# Patient Record
Sex: MALE | Race: BLACK OR AFRICAN AMERICAN | NOT HISPANIC OR LATINO | URBAN - METROPOLITAN AREA
[De-identification: names, ages, dates, MRNs, and addresses within clinical notes are randomized per-mention and may not be internally consistent; named-entity substitution may affect disease eponyms.]

---

## 2017-10-18 ENCOUNTER — OFFICE VISIT (OUTPATIENT)
Dept: FAMILY MEDICINE | Facility: CLINIC | Age: 47
End: 2017-10-18

## 2017-10-18 VITALS
BODY MASS INDEX: 26.29 KG/M2 | TEMPERATURE: 98.4 F | DIASTOLIC BLOOD PRESSURE: 84 MMHG | OXYGEN SATURATION: 100 % | HEIGHT: 66 IN | WEIGHT: 163.6 LBS | SYSTOLIC BLOOD PRESSURE: 137 MMHG | HEART RATE: 65 BPM

## 2017-10-18 DIAGNOSIS — M54.2 NECK PAIN: Primary | ICD-10-CM

## 2017-10-18 RX ORDER — CYCLOBENZAPRINE HCL 10 MG
10 TABLET ORAL 3 TIMES DAILY PRN
Qty: 60 TABLET | Refills: 0 | Status: SHIPPED | OUTPATIENT
Start: 2017-10-18 | End: 2017-12-01

## 2017-10-18 RX ORDER — NAPROXEN 500 MG/1
500 TABLET ORAL
Qty: 60 TABLET | Refills: 1 | Status: SHIPPED | OUTPATIENT
Start: 2017-10-18 | End: 2017-12-01

## 2017-10-18 NOTE — PATIENT INSTRUCTIONS
Thank you for choosing [facility].   I recommend Physical Therapy for your neck and back.  For now I would like you to use Naproxen 500mg and a Muscle Relaxer   Also, please stop at the  for your referral and physical appointment.  Also, please return if your neck or back worsens.     Thank you for coming to [facility].  **If you had lab testing today and your results are reassuring or normal they will be be mailed to you within 7 days.   **If the lab tests need quick action we will call you with the results.  The phone number we will call with results is # [phone] (home) . If this is not the best number please call our clinic and change the number.  If you need any refills please call your pharmacy and they will contact us.  If you have any concerns about today's visit or wish to schedule another appointment please call our office during normal business hours [phone] (8-5:00 M-F)  If you have urgent medical concerns please call [phone] at any time of the day.  If you a medical emergency please call [phone]  Again thank you for choosing [facility] and please let us know how we can best partner with you to improve you and your family's health.    PHYSICAL THERAPY REFERRAL:  Orders and demographics faxed to [facility] and they will call patient to schedule.  PH:  [phone]   FAX:  [phone]  Akua Lima  10/19/17

## 2017-10-18 NOTE — PROGRESS NOTES
"There are no exam notes on file for this visit.  Chief Complaint   Patient presents with     Establish Care     Pain     neck, shoulder and upper mid-back, began a gym membership 1 month ago and has been having pain for 3 weeks, has tried ice and heat      Blood pressure 137/84, pulse 65, temperature 98.4  F (36.9  C), height 5' 6.34\" (168.5 cm), weight 163 lb 9.6 oz (74.2 kg), SpO2 100 %.    The patient comes in today to establish care, and for evaluation of neck pain.    The patient is a 47-year-old male who has completed 12 grades of formal education. 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 He is currently . He has not been hospitalized overnight and has not had any surgeries in the last 5 years. He has no allergy to penicillin. He is unclear what that allergy entails but was told by his mother that he should not take penicillin again. The patient does smoke about a half a pack of cigarettes a day and he does not drink alcohol.  He has never had an ulcer, GI problems, or renal problems.    Patient's acute issue is that he joined a gym about 1 month ago. For the last 3 weeks, the patient has had left sided shoulder pain. The patient describes this pain is located in the left posterior neck radiating down to his left shoulder. He also notes some pain that radiates down the middle of his back. He brings with him a picture from the Internet on his cell phone of distribution of a herniated neck disc and says that his pain is in this distribution.    The patient awoke one morning 3 weeks ago with a stiff neck. By the following day he was \"paralyzed \". She has been using ice and heat and this is not been much help. He has been using ibuprofen which does not completely alleviate the pain but helps him \"get by\".     The patient notes the pain as best as he is standing and is worse as he sits down. He also does describes a stiffness in his neck.    Objective: This is a well-nourished, " well-developed male, who is in no acute distress. His vital signs are as noted above, and are within normal limits. His neck has a normal range of motion, although he has some subjective pain on extreme rotation to the right and right lateral bending. He does not have any midline cervical thoracic or lumbar tenderness area he does have tenderness in the right lateral neck, although this does not extend down into his upper back. He shortly range of motion is within normal limits. His muscle strength is within normal limits in all major muscle groups in the upper extremities. His sensation is intact to light touch.    Assessment: Neck pain. I believe this is likelysecondary to starting working out at the gym, because they seem  related in the time course.    Plan: I discussed the patient I believe physical therapy is keyed to this. I believe they can help both with the acute pain, as well as provide him with instructions to prevent recurrence of this pain. In the meantime, we'll treat his symptoms with naproxen and cyclobenzaprine. Patient was cautioned regarding the GI side effects of NSAID use. He is to take the naproxen with meals. He is not to exceed the dose. We will see the patient back in 2 weeks if he is still having difficulty. Patient would like to schedule an appointment for a physical examination and this is encouraged.    Neck pain  -     PHYSICAL THERAPY REFERRAL; Future  -     naproxen (NAPROSYN) 500 MG tablet; Take 1 tablet (500 mg) by mouth 3 times daily (with meals)  -     cyclobenzaprine (FLEXERIL) 10 MG tablet; Take 1 tablet (10 mg) by mouth 3 times daily as needed for muscle spasms    The patient was actively involved in the decision making process, and all the questions were answered to their satisfaction prior to leaving.

## 2017-10-18 NOTE — MR AVS SNAPSHOT
After Visit Summary   10/18/2017    Jarvis Velásquez    MRN: [id number]           Patient Information     Date Of Birth          1970        Visit Information        Provider Department      10/18/2017 10:00 AM Dex Chen MD [facility]        Today's Diagnoses     Neck pain    -  1      Care Instructions    Thank you for choosing [facility].   I recommend Physical Therapy for your neck and back.  For now I would like you to use Naproxen 500mg and a Muscle Relaxer   Also, please stop at the  for your referral and physical appointment.  Also, please return if your neck or back worsens.     Thank you for coming to [facility].  **If you had lab testing today and your results are reassuring or normal they will be be mailed to you within 7 days.   **If the lab tests need quick action we will call you with the results.  The phone number we will call with results is # [phone] (home) . If this is not the best number please call our clinic and change the number.  If you need any refills please call your pharmacy and they will contact us.  If you have any concerns about today's visit or wish to schedule another appointment please call our office during normal business hours [phone] (8-5:00 M-F)  If you have urgent medical concerns please call [phone] at any time of the day.  If you a medical emergency please call [phone]  Again thank you for choosing [facility] and please let us know how we can best partner with you to improve you and your family's health.              Follow-ups after your visit        Additional Services     PHYSICAL THERAPY REFERRAL       PT/OT REFERRAL  Jarvis Velásquez  1970  Phone #: [phone] (home)    needed: No  Language: English    PT/OT  Facility:   [facility], P: [phone], F: [phone]    History: Neck and back pain    Precautions/Contraindications: None    Imaging Studies:  "None    Prognosis: excellent    Visits: Up to 12    Evaluate: Evaluate and treat                  Future tests that were ordered for you today     Open Future Orders        Priority Expected Expires Ordered    PHYSICAL THERAPY REFERRAL Routine  10/18/2018 10/18/2017            Who to contact     Please call your clinic at [phone] to:    Ask questions about your health    Make or cancel appointments    Discuss your medicines    Learn about your test results    Speak to your doctor   If you have compliments or concerns about an experience at your clinic, or if you wish to file a complaint, please contact [facility] Patient Relations at [phone] or email us at [email or web address]         Additional Information About Your Visit        CoinSeedhart Information     [employer] is an electronic gateway that provides easy, online access to your medical records. With Ahalogy, you can request a clinic appointment, read your test results, renew a prescription or communicate with your care team.     To sign up for Ahalogy visit the website at [email or web address]   You will be asked to enter the access code listed below, as well as some personal information. Please follow the directions to create your username and password.     Your access code is: [email or web address]  Expires: 2018 10:41 AM     Your access code will  in 90 days. If you need help or a new code, please contact your [facility] Clinic or call [phone] for assistance.        Care EveryWhere ID     This is your Care EveryWhere ID. This could be used by other organizations to access your [facility] medical records  [id number]        Your Vitals Were     Pulse Temperature Height Pulse Oximetry BMI (Body Mass Index)       65 98.4  F (36.9  C) 5' 6.34\" (168.5 cm) 100% 26.14 kg/m2        Blood Pressure from Last 3 Encounters:   10/18/17 137/84    Weight from Last 3 Encounters:   10/18/17 163 lb " 9.6 oz (74.2 kg)                 Today's Medication Changes          These changes are accurate as of: 10/18/17 12:03 PM.  If you have any questions, ask your nurse or doctor.               Start taking these medicines.        Dose/Directions    cyclobenzaprine 10 MG tablet   Commonly known as:  FLEXERIL   Used for:  Neck pain   Started by:  Dex Chen MD        Dose:  10 mg   Take 1 tablet (10 mg) by mouth 3 times daily as needed for muscle spasms   Quantity:  60 tablet   Refills:  0       naproxen 500 MG tablet   Commonly known as:  NAPROSYN   Used for:  Neck pain   Started by:  Dex Chen MD        Dose:  500 mg   Take 1 tablet (500 mg) by mouth 3 times daily (with meals)   Quantity:  60 tablet   Refills:  1            Where to get your medicines      These medications were sent to [facility] [address] [place] [address]) [place] [address]     Phone:  [phone]     cyclobenzaprine 10 MG tablet    naproxen 500 MG tablet                Primary Care Provider Office Phone # Fax #    Tee Ng [phone]       [address] [address]        Equal Access to Services     ARUNA PHAM : Hadii georgia sinha hadasho Soomaali, waaxda luqadaha, qaybta kaalmada adeegyada, jaden sanchez haykatie jalloh. So [facility] [phone].    ATENCIÓN: Si habla español, tiene a vega disposición servicios gratuitos de asistencia lingüística. Llame al [phone].    We comply with applicable federal civil rights laws and [place] laws. We do not discriminate on the basis of race, color, national origin, age, disability, sex, sexual orientation, or gender identity.            Thank you!     Thank you for choosing [facility]  for your care. Our goal is always to provide you with excellent care. Hearing back from our patients is one way we can continue to improve our services. Please  take a few minutes to complete the written survey that you may receive in the mail after your visit with us. Thank you!             Your Updated Medication List - Protect others around you: Learn how to safely use, store and throw away your medicines at www.disposemymeds.org.          This list is accurate as of: 10/18/17 12:03 PM.  Always use your most recent med list.                   Brand Name Dispense Instructions for use Diagnosis    cyclobenzaprine 10 MG tablet    FLEXERIL    60 tablet    Take 1 tablet (10 mg) by mouth 3 times daily as needed for muscle spasms    Neck pain       naproxen 500 MG tablet    NAPROSYN    60 tablet    Take 1 tablet (500 mg) by mouth 3 times daily (with meals)    Neck pain

## 2017-10-20 ENCOUNTER — AMBULATORY - HEALTHEAST (OUTPATIENT)
Dept: ADMINISTRATIVE | Facility: REHABILITATION | Age: 47
End: 2017-10-20

## 2017-10-20 DIAGNOSIS — M54.2 NECK PAIN: ICD-10-CM

## 2017-11-02 ENCOUNTER — TELEPHONE (OUTPATIENT)
Dept: FAMILY MEDICINE | Facility: CLINIC | Age: 47
End: 2017-11-02

## 2017-12-01 ENCOUNTER — OFFICE VISIT (OUTPATIENT)
Dept: FAMILY MEDICINE | Facility: CLINIC | Age: 47
End: 2017-12-01

## 2017-12-01 VITALS
DIASTOLIC BLOOD PRESSURE: 88 MMHG | WEIGHT: 168 LBS | HEART RATE: 64 BPM | OXYGEN SATURATION: 100 % | BODY MASS INDEX: 26.84 KG/M2 | TEMPERATURE: 98.4 F | SYSTOLIC BLOOD PRESSURE: 136 MMHG

## 2017-12-01 DIAGNOSIS — M25.512 ACUTE PAIN OF LEFT SHOULDER: ICD-10-CM

## 2017-12-01 DIAGNOSIS — Z00.00 ROUTINE GENERAL MEDICAL EXAMINATION AT A HEALTH CARE FACILITY: ICD-10-CM

## 2017-12-01 DIAGNOSIS — M54.2 NECK PAIN: Primary | ICD-10-CM

## 2017-12-01 LAB
CHOLEST SERPL-MCNC: 216.6 MG/DL (ref 0–200)
CHOLEST/HDLC SERPL: 3.1 {RATIO} (ref 0–5)
HBA1C MFR BLD: 5.5 % (ref 4.1–5.7)
HDLC SERPL-MCNC: 70.6 MG/DL
LDLC SERPL CALC-MCNC: 119 MG/DL (ref 0–129)
TRIGL SERPL-MCNC: 134.1 MG/DL (ref 0–150)
VLDL CHOLESTEROL: 26.8 MG/DL (ref 7–32)

## 2017-12-01 RX ORDER — NAPROXEN 500 MG/1
500 TABLET ORAL 2 TIMES DAILY WITH MEALS
Qty: 60 TABLET | Refills: 1 | Status: SHIPPED | OUTPATIENT
Start: 2017-12-01 | End: 2018-07-13

## 2017-12-01 RX ORDER — CYCLOBENZAPRINE HCL 10 MG
10 TABLET ORAL 3 TIMES DAILY PRN
Qty: 60 TABLET | Refills: 0 | Status: SHIPPED | OUTPATIENT
Start: 2017-12-01 | End: 2018-07-13

## 2017-12-01 RX ORDER — CYCLOBENZAPRINE HCL 10 MG
10 TABLET ORAL 3 TIMES DAILY PRN
Qty: 60 TABLET | Refills: 0 | Status: CANCELLED | OUTPATIENT
Start: 2017-12-01

## 2017-12-01 NOTE — PROGRESS NOTES
This is a 47-year-old male who follows up for a complaint of neck pain and left shoulder pain. He had been seen here about 6 weeks ago and had been referred to physical therapy however some miscommunication occurred and he ended up never hearing about the appointment. He remains interested in pursuing this.    I reviewed the chronology of his symptoms with him. He says that about 2-3 months ago he started working out at a gym having been relatively sedentary prior to that. He doesn't know if he injured himself but developed acute neck pain radiating down his left trapezius into his shoulder which is quite severe over the course of one to 2 weeks. Initially the was a burning quality to the pain and the was some numbness over the left trapezius area.    He reports that his symptoms have improved quite a bit over the last 6 weeks without any treatment. He has been taking the naproxen and Flexeril given him and thinks they do help. Currently he does not have any sensory symptoms. He is able to move his neck without discomfort. He continues to notice occasional shoulder pains when he moves a certain way and his difficulty reproducing those movements for me today. He notices some clunking and clicking in his shoulder that he believes was not present previously.    He is also interested in checking a lipid panel. He says he doesn't believe he's ever had one checked as an adult. He feels he is generally healthy. He's not stay in touch with the family and still doesn't have a full sense of family history. He's never had a physical as an adult.    He is currently working as a  having previously worked as a cook.    Objective:  /88  Pulse 64  Temp 98.4  F (36.9  C)  Wt 168 lb (76.2 kg)  SpO2 100%  BMI 26.84 kg/m2  His vitals are satisfactory.  His range of motion of the cervical spine appears within normal limits. He has no tenderness on palpation of the cervical spinous processes. He has inconsistent  findings on testing for impingement syndrome of the left shoulder with some sizing positive and others being negative. For example the pour can sign is mildly positive.  I offered him a cortisone shot and to the shoulder but he declined for now wishing to see if physical therapy alone will resolve the symptoms.    Jarvis was seen today for shoulder pain.    Diagnoses and all orders for this visit:    Neck pain  -     PHYSICAL THERAPY REFERRAL; Future  -     naproxen (NAPROSYN) 500 MG tablet; Take 1 tablet (500 mg) by mouth 2 times daily (with meals)  -     cyclobenzaprine (FLEXERIL) 10 MG tablet; Take 1 tablet (10 mg) by mouth 3 times daily as needed for muscle spasms    Acute pain of left shoulder  -     PHYSICAL THERAPY REFERRAL; Future    Routine general medical examination at a health care facility  -     Hemoglobin A1c ([facility])  -     Lipid Panel ([facility])     I placed a referral to physical therapy expressly asking for some cervical traction. I believe he is a mixture of both neck and shoulder etiologies for his symptoms. We've agreed that he will return in 4 weeks' time if his symptoms are not fully better and I would either consider MRI imaging at that time versus a cortisone shot into the left shoulder.    I've ordered a lipid panel and a screen for diabetes. He could consider having a healthcare maintenance exam in the future.

## 2017-12-01 NOTE — LETTER
December 4, 2017      Jarvis Velásqeuz  [address] [place] [address]        Dear Jarvis,    Please see below for your test results.    Resulted Orders   Hemoglobin A1c ([facility])   Result Value Ref Range    Hemoglobin A1C 5.5 4.1 - 5.7 %   Lipid Panel ([facility])   Result Value Ref Range    Cholesterol 216.6 (H) 0.0 - 200.0 mg/dL    Cholesterol/HDL Ratio 3.1 0.0 - 5.0    HDL Cholesterol 70.6 >40.0 mg/dL    LDL Cholesterol Calculated 119 0 - 129 mg/dL    Triglycerides 134.1 0.0 - 150.0 mg/dL    VLDL Cholesterol 26.8 7.0 - 32.0 mg/dL     Carlos A Conley -     You are not diabetic and your cholesterol panel is good because you have a high level of the protective HDL cholesterol.        Take care, Dr Ng

## 2017-12-01 NOTE — PATIENT INSTRUCTIONS
If you are not 100% better in 4 weeks, please come back to see me.      Referral has been sent to [facility] per request.   Phone:[phone]  Fax:[phone]  They will contact patient to schedule. Number has been verify ed with patient.   Kathi  12/01/17

## 2018-01-16 ENCOUNTER — OFFICE VISIT (OUTPATIENT)
Dept: FAMILY MEDICINE | Facility: CLINIC | Age: 48
End: 2018-01-16
Payer: COMMERCIAL

## 2018-01-16 VITALS
TEMPERATURE: 98.1 F | WEIGHT: 171.6 LBS | BODY MASS INDEX: 27.41 KG/M2 | HEART RATE: 92 BPM | SYSTOLIC BLOOD PRESSURE: 121 MMHG | DIASTOLIC BLOOD PRESSURE: 76 MMHG

## 2018-01-16 DIAGNOSIS — H61.23 BILATERAL IMPACTED CERUMEN: Primary | ICD-10-CM

## 2018-01-16 NOTE — PROGRESS NOTES
There are no exam notes on file for this visit.  Chief Complaint   Patient presents with     Otalgia     his right ear has been plugged up for 1 week and no drainage at all      Blood pressure 121/76, pulse 92, temperature 98.1  F (36.7  C), temperature source Oral, weight 171 lb 9.6 oz (77.8 kg).    Assessment and Plan   (H61.23) Bilateral impacted cerumen  (primary encounter diagnosis)  Comment: s/p irrigation and removal of ear wax      Options for treatment and follow-up care were reviewed with the patient and/or guardian. Jarvis LEDEZMA Didier and/or guardian engaged in the decision making process and verbalized understanding of the options discussed and agreed with the final plan.  Patient discussed with Dr. Constantino ears: Significant amount of.   Turner Armijo, DO LYMAN       Jarvis Velásquez is a 47 year old  Male of right-sided ear fullness and congestion.  Patient does use Q-tips to clean his ears but has noted over the last several days that there is congestion and the feeling of debris in the right ear canal.  He has applied eardrops without relief.  He has some decreased hearing because of this.  No trauma or drainage noted.  No fevers chills or recent upper respiratory infections. Never had this before.    There is no problem list on file for this patient.    Current Outpatient Prescriptions   Medication Sig Dispense Refill     naproxen (NAPROSYN) 500 MG tablet Take 1 tablet (500 mg) by mouth 2 times daily (with meals) (Patient not taking: Reported on 1/16/2018) 60 tablet 1     cyclobenzaprine (FLEXERIL) 10 MG tablet Take 1 tablet (10 mg) by mouth 3 times daily as needed for muscle spasms (Patient not taking: Reported on 1/16/2018) 60 tablet 0     Allergies   Allergen Reactions     Penicillins Unknown     History reviewed. No pertinent family history.  No results found for this or any previous visit (from the past 24 hour(s)).         Review of Systems:   As Above             Physical Exam:      Vitals:    01/16/18 1633   BP: 121/76   Pulse: 92   Temp: 98.1  F (36.7  C)   TempSrc: Oral   Weight: 171 lb 9.6 oz (77.8 kg)     Body mass index is 27.41 kg/(m^2).    Earwax and other hyperpigmented debris noted in the right and to a lesser extent left ear canal.  Utilized a curette and removed a significant amount of debris in the right ear canal and then both ear canals were irrigated.  After irrigation TMs were clearly visualized and did not show any evidence of erythema bulging or infection.    Office Visit on 12/01/2017   Component Date Value Ref Range Status     Hemoglobin A1C 12/01/2017 5.5  4.1 - 5.7 % Final     Cholesterol 12/01/2017 216.6* 0.0 - 200.0 mg/dL Final     Cholesterol/HDL Ratio 12/01/2017 3.1  0.0 - 5.0 Final     HDL Cholesterol 12/01/2017 70.6  >40.0 mg/dL Final     LDL Cholesterol Calculated 12/01/2017 119  0 - 129 mg/dL Final     Triglycerides 12/01/2017 134.1  0.0 - 150.0 mg/dL Final     VLDL Cholesterol 12/01/2017 26.8  7.0 - 32.0 mg/dL Final

## 2018-01-16 NOTE — MR AVS SNAPSHOT
After Visit Summary   2018    Jarvis Velásquez    MRN: [id number]           Patient Information     Date Of Birth          1970        Visit Information        Provider Department      2018 4:30 PM Turner Armijo, [facility]        Today's Diagnoses     Bilateral impacted cerumen    -  1       Follow-ups after your visit        Who to contact     Please call your clinic at [phone] to:    Ask questions about your health    Make or cancel appointments    Discuss your medicines    Learn about your test results    Speak to your doctor   If you have compliments or concerns about an experience at your clinic, or if you wish to file a complaint, please contact [facility] Patient Relations at [phone] or email us at [email or web address]         Additional Information About Your Visit        MyChart Information     The Moment is an electronic gateway that provides easy, online access to your medical records. With The Moment, you can request a clinic appointment, read your test results, renew a prescription or communicate with your care team.     To sign up for Zipline Gamest visit the website at [email or web address]   You will be asked to enter the access code listed below, as well as some personal information. Please follow the directions to create your username and password.     Your access code is: [email or web address]  Expires: 2018 12:48 PM     Your access code will  in 90 days. If you need help or a new code, please contact your [facility] or call [phone] for assistance.        Care EveryWhere ID     This is your Care EveryWhere ID. This could be used by other organizations to access your [facility] medical records  [id number]        Your Vitals Were     Pulse Temperature BMI (Body Mass Index)             92 98.1  F (36.7  C) (Oral) 27.41 kg/m2          Blood Pressure from Last 3 Encounters:    01/16/18 121/76   12/01/17 136/88   10/18/17 137/84    Weight from Last 3 Encounters:   01/16/18 171 lb 9.6 oz (77.8 kg)   12/01/17 168 lb (76.2 kg)   10/18/17 163 lb 9.6 oz (74.2 kg)              Today, you had the following     No orders found for display       Primary Care Provider Office Phone # Fax #    Tee Ng [phone]       [address] [address]        Equal Access to Services     [facility] : Hadii georgia ku hadasho Soomaali, waaxda luqadaha, qaybta kaalmada adefionayarossy, jaden perera . So [facility] [phone].    ATENCIÓN: Si habla español, tiene a vega disposición servicios gratuitos de asistencia lingüística. [facility] [phone].    We comply with applicable federal civil rights laws and [place] laws. We do not discriminate on the basis of race, color, national origin, age, disability, sex, sexual orientation, or gender identity.            Thank you!     Thank you for choosing [facility]  for your care. Our goal is always to provide you with excellent care. Hearing back from our patients is one way we can continue to improve our services. Please take a few minutes to complete the written survey that you may receive in the mail after your visit with us. Thank you!             Your Updated Medication List - Protect others around you: Learn how to safely use, store and throw away your medicines at www.disposemymeds.org.          This list is accurate as of 1/16/18 11:59 PM.  Always use your most recent med list.                   Brand Name Dispense Instructions for use Diagnosis    cyclobenzaprine 10 MG tablet    FLEXERIL    60 tablet    Take 1 tablet (10 mg) by mouth 3 times daily as needed for muscle spasms    Neck pain       naproxen 500 MG tablet    NAPROSYN    60 tablet    Take 1 tablet (500 mg) by mouth 2 times daily (with meals)    Neck pain

## 2018-01-16 NOTE — PROGRESS NOTES
Preceptor attestation:  Patient seen and discussed with the resident. Assessment and plan reviewed with resident and agreed upon.  Supervising physician: Frederick Constantino MD  [facility]

## 2018-07-05 ENCOUNTER — TELEPHONE (OUTPATIENT)
Dept: FAMILY MEDICINE | Facility: CLINIC | Age: 48
End: 2018-07-05

## 2018-07-05 NOTE — TELEPHONE ENCOUNTER
Patient states that he was calling to schedule an appt with Dr. Chen. Patient was transferred to appt. /LETICIA Ha

## 2018-07-05 NOTE — TELEPHONE ENCOUNTER
[facility] phone call message- general phone call:    Reason for call: He needs a call back from  he would like a call back re his last appointment (would not leave more detail).    Return call needed: Yes    OK to leave a message on voice mail? Yes    Primary language: English      needed? No    Call taken on July 5, 2018 at 10:37 AM by Selena Garrison

## 2018-07-13 ENCOUNTER — OFFICE VISIT (OUTPATIENT)
Dept: FAMILY MEDICINE | Facility: CLINIC | Age: 48
End: 2018-07-13
Payer: COMMERCIAL

## 2018-07-13 VITALS
SYSTOLIC BLOOD PRESSURE: 112 MMHG | BODY MASS INDEX: 26.14 KG/M2 | TEMPERATURE: 98.3 F | OXYGEN SATURATION: 100 % | RESPIRATION RATE: 20 BRPM | WEIGHT: 163.6 LBS | HEART RATE: 76 BPM | DIASTOLIC BLOOD PRESSURE: 79 MMHG

## 2018-07-13 DIAGNOSIS — Z23 NEED FOR VACCINATION: ICD-10-CM

## 2018-07-13 DIAGNOSIS — M54.2 NECK PAIN: ICD-10-CM

## 2018-07-13 DIAGNOSIS — N52.9 ERECTILE DYSFUNCTION, UNSPECIFIED ERECTILE DYSFUNCTION TYPE: ICD-10-CM

## 2018-07-13 DIAGNOSIS — Z00.00 PREVENTATIVE HEALTH CARE: Primary | ICD-10-CM

## 2018-07-13 DIAGNOSIS — Z00.00 ROUTINE GENERAL MEDICAL EXAMINATION AT A HEALTH CARE FACILITY: ICD-10-CM

## 2018-07-13 DIAGNOSIS — Z00.00 HEALTH CARE MAINTENANCE: ICD-10-CM

## 2018-07-13 LAB — HIV 1+2 AB+HIV1 P24 AG SERPL QL IA: NEGATIVE

## 2018-07-13 RX ORDER — SILDENAFIL 100 MG/1
50 TABLET, FILM COATED ORAL DAILY PRN
Qty: 6 TABLET | Refills: 1 | Status: SHIPPED | OUTPATIENT
Start: 2018-07-13 | End: 2018-07-20

## 2018-07-13 RX ORDER — NAPROXEN 500 MG/1
500 TABLET ORAL 2 TIMES DAILY WITH MEALS
Qty: 60 TABLET | Refills: 1 | Status: SHIPPED | OUTPATIENT
Start: 2018-07-13 | End: 2018-12-26

## 2018-07-13 NOTE — LETTER
July 16, 2018      Jarvis Velásquez  [address] [place] [address]        Dear Jarvis,    Please see below for your test results.    The HIV and syphilis tests are normal     Resulted Orders   HIV Ag/Ab Screen [place] ([facility])   Result Value Ref Range    HIV Antigen/Antibody Negative Negative    Narrative    Test performed by:  [facility] [address] [place] [address]  Method is Abbott HIV Ag/Ab for the detection of HIV p24 antigen, HIV-1   antibodies and HIV-2 antibodies.   Syphilis Screen [place] (RPR/VDRL) ([facility])   Result Value Ref Range    Treponema Antibody (Syphilis) Negative Negative    Narrative    Test performed by:  [facility] [address] [place] [address]       If you have any questions, please call the clinic to make an appointment.    Sincerely,    Dex Chen MD

## 2018-07-13 NOTE — PATIENT INSTRUCTIONS
"If the medicine is too expensive, use \"Good Rx\" to look for the best price around    Preventive Health Recommendations  Male Ages 40 to 49    Yearly exam:             See your health care provider every year in order to  o   Review health changes.   o   Discuss preventive care.    o   Review your medicines if your doctor has prescribed any.    You should be tested each year for STDs (sexually transmitted diseases) if you re at risk.     Have a cholesterol test every 5 years.     Have a colonoscopy (test for colon cancer) if someone in your family has had colon cancer or polyps before age 50.     After age 45, have a diabetes test (fasting glucose). If you are at risk for diabetes, you should have this test every 3 years.      Talk with your health care provider about whether or not a prostate cancer screening test (PSA) is right for you.    Shots: Get a flu shot each year. Get a tetanus shot every 10 years.     Nutrition:    Eat at least 5 servings of fruits and vegetables daily.     Eat whole-grain bread, whole-wheat pasta and brown rice instead of white grains and rice.     Get adequate Calcium and Vitamin D.     Lifestyle    Exercise for at least 150 minutes a week (30 minutes a day, 5 days a week). This will help you control your weight and prevent disease.     Limit alcohol to one drink per day.     No smoking.     Wear sunscreen to prevent skin cancer.     See your dentist every six months for an exam and cleaning.      "

## 2018-07-13 NOTE — PROGRESS NOTES
"    Male Physical Note      Concerns today: The patient presented today for a complete physical examination.    Is a 47-year-old male who has completed 12 years of education. He currently works as a cook at [employer].    The patient believes he is allergic to penicillin. He has no idea what the reaction was. He was told by his mother \"a long time ago\" that he was allergic to penicillin. He has since passed this on to his providers.    The patient's review of systems is negative for everything except sexual difficulties.    The patient states that over the past 2-3 months he has had difficulty maintaining an adequate erection. The patient tells me that his erection not last long enough for him to complete intercourse satisfactorily. He has tried over-the-counter medications from health stores, and found this not to be of any benefit.    The patient does not endorse nocturia. He does believe that he empties completely. He does not have any dysuria or hesitancy, but he does endorse urinary urgency. He believes this is because he drinks quite a bit of water while at work.    The patient smokes a half a pack per day of cigarettes. He is not particularly interested in stopping at this point, but believes that stopping would be a good thing to do at some point in the future.    He does not use street drugs or marijuana. He does feel safe at home. He's never been the victim of sexual, emotional, or physical abuse. He does not drink alcohol.      ROS:                      CONSTITUTIONAL: no fatigue, no unexpected change in weight  SKIN: no worrisome rashes, no worrisome moles, no worrisome lesions  EYES: no acute vision problems or changes  ENT: no ear problems, no mouth problems, no throat problems  RESP: no significant cough, no shortness of breath  CV: no chest pain, no palpitations, no new or worsening peripheral edema  GI: no nausea, no vomiting, no constipation, no diarrhea  : no frequency, no " dysuria, no hematuria  MS: no claudication, no myalgias, no joint aches  NEURO: no weakness, no dizziness, no syncope, no headaches    History reviewed. No pertinent past medical history.     History reviewed. No pertinent family history.  Reviewed no other significant FH           Family History and past Medical History reviewed and unchanged/updated.    Social History   Substance Use Topics     Smoking status: Current Every Day Smoker     Packs/day: 0.50     Types: Cigarettes     Smokeless tobacco: Never Used     Alcohol use No     Partnered    Has anyone hurt you physically, for example by pushing, hitting, slapping or kicking you or forcing you to have sex? Denies  Do you feel threatened or controlled by a partner, ex-partner or anyone in your life? Denies    RISK BEHAVIORS AND HEALTHY HABITS:  Tobacco Use/Smoking:  Details see above  Illicit Drug Use: None  ETOH: None  Do you use alcohol? No  Sexually Active: Yes  Diet (5-7 servings of fruits/veg daily): No   Exercise (30 min accumulated most days):No  Dental Care: Yes   Calcium 1500 mg/d:  No  Seat Belt Use: Yes       There is no immunization history on file for this patient.  Reviewed Immunization Record Today    EXAMINATION:  /79  Pulse 76  Temp 98.3  F (36.8  C) (Oral)  Resp 20  Wt 163 lb 9.6 oz (74.2 kg)  SpO2 100%  BMI 26.14 kg/m2  GENERAL: healthy, alert and no distress  EYES: Eyes grossly normal to inspection, extraocular movements - intact, and PERRL  HENT: ear canals- normal; TMs- normal; Nose- normal; Mouth- no ulcers, no lesions  NECK: no tenderness, no adenopathy, no asymmetry, no masses, no stiffness; thyroid- normal to palpation  RESP: lungs clear to auscultation - no rales, no rhonchi, no wheezes  BREAST: no masses, no tenderness, no nipple discharge, no palpable axillary masses or adenopathy  CV: regular rates and rhythm, normal S1 S2, no S3 or S4 and no murmur, no click or rub -  ABDOMEN: soft, no tenderness, no   hepatosplenomegaly, no masses, normal bowel sounds  MS: extremities- no gross deformities noted, no edema  SKIN: no suspicious lesions, no rashes  NEURO: strength and tone- normal, sensory exam- grossly normal, mentation- intact, speech- normal, reflexes- symmetric  BACK: no CVA tenderness, no paralumbar tenderness  - male: testicles- normal, no atrophy, no masses;  no inguinal hernias  PSYCH: Alert and oriented; speech- coherent , normal rate and volume; able to articulate logical thoughts, able to abstract reason, no tangential thoughts, no hallucinations or delusions, affect- normal  LYMPHATICS: ant. cervical- normal, post. cervical- normal, axillary- normal, supraclavicular- normal, inguinal- normal    ASSESSMENT & PLAN:    Preventative health care  -     HIV Ag/Ab Screen [place] ([facility])  -     Syphilis Screen [place] (RPR/VDRL) ([facility])    Neck pain  -     naproxen (NAPROSYN) 500 MG tablet; Take 1 tablet (500 mg) by mouth 2 times daily (with meals)    Erectile dysfunction, unspecified erectile dysfunction type  -     sildenafil (VIAGRA) 100 MG tablet; Take 0.5 tablets (50 mg) by mouth daily as needed 30 min to 4 hrs before sex. Do not use with nitroglycerin, terazosin or doxazosin. If not effective, may use 1 pill.    Discussed using generic sidenafil.  The patient would prefer the higher dose, because he has difficulty taking pills.    The patient and I discussed other options, including referral to urology for a vacuum-assisted device, injections, implants, and further advice as to further options. The patient would prefer this current course of therapy. He will call or return to clinic if he has difficulty, or questions.    Routine general medical examination at a health care facility    Need for vaccination  -     ADMIN VACCINE, INITIAL  -     TDAP VACCINE ([facility])

## 2018-07-13 NOTE — MR AVS SNAPSHOT
"              After Visit Summary   7/13/2018    Jarvis Velásquez    MRN: [id number]           Patient Information     Date Of Birth          1970        Visit Information        Provider Department      7/13/2018 2:30 PM Dex Chen MD [facility]        Today's Diagnoses     Preventative health care    -  1    Neck pain        Erectile dysfunction, unspecified erectile dysfunction type        Routine general medical examination at a health care facility        Need for vaccination          Care Instructions    If the medicine is too expensive, use \"Good Rx\" to look for the best price around    Preventive Health Recommendations  Male Ages 40 to 49    Yearly exam:             See your health care provider every year in order to  o   Review health changes.   o   Discuss preventive care.    o   Review your medicines if your doctor has prescribed any.    You should be tested each year for STDs (sexually transmitted diseases) if you re at risk.     Have a cholesterol test every 5 years.     Have a colonoscopy (test for colon cancer) if someone in your family has had colon cancer or polyps before age 50.     After age 45, have a diabetes test (fasting glucose). If you are at risk for diabetes, you should have this test every 3 years.      Talk with your health care provider about whether or not a prostate cancer screening test (PSA) is right for you.    Shots: Get a flu shot each year. Get a tetanus shot every 10 years.     Nutrition:    Eat at least 5 servings of fruits and vegetables daily.     Eat whole-grain bread, whole-wheat pasta and brown rice instead of white grains and rice.     Get adequate Calcium and Vitamin D.     Lifestyle    Exercise for at least 150 minutes a week (30 minutes a day, 5 days a week). This will help you control your weight and prevent disease.     Limit alcohol to one drink per day.     No smoking.     Wear sunscreen to prevent skin cancer.     See your dentist every six " months for an exam and cleaning.              Follow-ups after your visit        Who to contact     Please call your clinic at [phone] to:    Ask questions about your health    Make or cancel appointments    Discuss your medicines    Learn about your test results    Speak to your doctor            Additional Information About Your Visit        Care EveryWhere ID     This is your Care EveryWhere ID. This could be used by other organizations to access your [facility] medical records  [id number]        Your Vitals Were     Pulse Temperature Respirations Pulse Oximetry BMI (Body Mass Index)       76 98.3  F (36.8  C) (Oral) 20 100% 26.14 kg/m2        Blood Pressure from Last 3 Encounters:   07/13/18 112/79   01/16/18 121/76   12/01/17 136/88    Weight from Last 3 Encounters:   07/13/18 163 lb 9.6 oz (74.2 kg)   01/16/18 171 lb 9.6 oz (77.8 kg)   12/01/17 168 lb (76.2 kg)              We Performed the Following     ADMIN VACCINE, INITIAL     HIV Ag/Ab Screen [place] ([facility])     Syphilis Screen [place] [facility]     TDAP VACCINE ([facility])          Today's Medication Changes          These changes are accurate as of 7/13/18  2:50 PM.  If you have any questions, ask your nurse or doctor.               Start taking these medicines.        Dose/Directions    sildenafil 100 MG tablet   Commonly known as:  VIAGRA   Used for:  Erectile dysfunction, unspecified erectile dysfunction type   Started by:  Dex Chen MD        Dose:  50 mg   Take 0.5 tablets (50 mg) by mouth daily as needed 30 min to 4 hrs before sex. Do not use with nitroglycerin, terazosin or doxazosin. If not effective, may use 1 pill.   Quantity:  6 tablet   Refills:  1            Where to get your medicines      These medications were sent to [facility] [address] [place] [address], [place] [address] [address]) [place] [address]     Phone:  [phone]     naproxen 500 MG tablet          Some of these will need a paper prescription and others can be bought over the counter.  Ask your nurse if you have questions.     Bring a paper prescription for each of these medications     sildenafil 100 MG tablet                Primary Care Provider Office Phone # Fax #    Tee Ng [phone]       [address] [place] [address]        Equal Access to Services     ARUNA PHAM : Hadii aad ku hadasho Soomaali, waaxda luqadaha, qaybta kaalmada adeegyada, ajden sanchez hayteaosei hennessy danielageena perera . So [facility] [phone].    ATENCIÓN: Si habla español, tiene a vega disposición servicios gratuitos de asistencia lingüística. Nainaame al [phone].    We comply with applicable federal civil rights laws and [place] laws. We do not discriminate on the basis of race, color, national origin, age, disability, sex, sexual orientation, or gender identity.            Thank you!     Thank you for choosing [facility]  for your care. Our goal is always to provide you with excellent care. Hearing back from our patients is one way we can continue to improve our services. Please take a few minutes to complete the written survey that you may receive in the mail after your visit with us. Thank you!             Your Updated Medication List - Protect others around you: Learn how to safely use, store and throw away your medicines at www.disposemymeds.org.          This list is accurate as of 7/13/18  2:50 PM.  Always use your most recent med list.                   Brand Name Dispense Instructions for use Diagnosis    naproxen 500 MG tablet    NAPROSYN    60 tablet    Take 1 tablet (500 mg) by mouth 2 times daily (with meals)    Neck pain       sildenafil 100 MG tablet    VIAGRA    6 tablet    Take 0.5 tablets (50 mg) by mouth daily as needed 30 min to 4 hrs before sex. Do not use with nitroglycerin, terazosin or doxazosin. If not effective, may use 1 pill.    Erectile dysfunction, unspecified erectile  dysfunction type

## 2018-07-14 LAB — TREPONEMA ANTIBODY (SYPHILIS): NEGATIVE

## 2018-07-16 PROBLEM — Z00.00 HEALTH CARE MAINTENANCE: Status: ACTIVE | Noted: 2018-07-16

## 2018-07-20 ENCOUNTER — TELEPHONE (OUTPATIENT)
Dept: FAMILY MEDICINE | Facility: CLINIC | Age: 48
End: 2018-07-20

## 2018-07-20 DIAGNOSIS — N52.9 ERECTILE DYSFUNCTION, UNSPECIFIED ERECTILE DYSFUNCTION TYPE: ICD-10-CM

## 2018-07-20 RX ORDER — SILDENAFIL 100 MG/1
50 TABLET, FILM COATED ORAL DAILY PRN
Qty: 6 TABLET | Refills: 1 | Status: SHIPPED | OUTPATIENT
Start: 2018-07-20 | End: 2023-04-10
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
## 2018-12-26 DIAGNOSIS — M54.2 NECK PAIN: ICD-10-CM

## 2018-12-26 RX ORDER — NAPROXEN 500 MG/1
500 TABLET ORAL 2 TIMES DAILY WITH MEALS
Qty: 60 TABLET | Refills: 1 | Status: SHIPPED | OUTPATIENT
Start: 2018-12-26 | End: 2019-08-13

## 2019-08-12 ENCOUNTER — TELEPHONE (OUTPATIENT)
Dept: FAMILY MEDICINE | Facility: CLINIC | Age: 49
End: 2019-08-12

## 2019-08-12 NOTE — TELEPHONE ENCOUNTER
[facility] Medicine phone call message- patient requesting a refill:    Full Medication Name:     naproxen (NAPROSYN) 500 MG tablet    Dose:     Take 1 tablet (500 mg) by mouth 2 times daily (with meals) - Oral    Pharmacy confirmed as   [facility], [address]) [place] [address]  Phone: [phone] Fax: [phone]  : Yes    Additional Comments:     None      OK to leave a message on voice mail? Yes    Primary language: English      needed? No    Call taken on August 12, 2019 at 3:29 PM by Melly Avilez

## 2019-08-13 DIAGNOSIS — M54.2 NECK PAIN: ICD-10-CM

## 2019-08-13 RX ORDER — NAPROXEN 500 MG/1
500 TABLET ORAL 2 TIMES DAILY WITH MEALS
Qty: 60 TABLET | Refills: 0 | Status: SHIPPED | OUTPATIENT
Start: 2019-08-13 | End: 2020-10-14

## 2020-09-03 DIAGNOSIS — M54.2 NECK PAIN: ICD-10-CM

## 2020-09-03 RX ORDER — NAPROXEN 500 MG/1
500 TABLET ORAL 2 TIMES DAILY WITH MEALS
Qty: 60 TABLET | Refills: 0 | OUTPATIENT
Start: 2020-09-03

## 2020-09-03 NOTE — TELEPHONE ENCOUNTER
[facility] phone call message- patient requesting a refill:    Full Medication Name:     naproxen (NAPROSYN) 500 MG tablet   Take 1 tablet (500 mg) by mouth 2 times daily (with meals) - Oral     Pharmacy confirmed as   [facility] [address]      : Yes    Additional Comments:     None    OK to leave a message on voice mail? Yes    Primary language: English      needed? No    Call taken on September 3, 2020 at 9:53 AM by Melly Avilez CMA

## 2020-09-03 NOTE — TELEPHONE ENCOUNTER
Patient request a refill on naproxen (NAPROSYN) 500 MG tablet.    Please advise.      LENNY Montes De OcaA

## 2020-10-14 ENCOUNTER — VIRTUAL VISIT (OUTPATIENT)
Dept: FAMILY MEDICINE | Facility: CLINIC | Age: 50
End: 2020-10-14
Payer: COMMERCIAL

## 2020-10-14 VITALS — BODY MASS INDEX: 29.82 KG/M2 | HEIGHT: 67 IN | WEIGHT: 190 LBS

## 2020-10-14 DIAGNOSIS — M54.2 NECK PAIN: ICD-10-CM

## 2020-10-14 DIAGNOSIS — R06.83 SNORES: Primary | ICD-10-CM

## 2020-10-14 PROCEDURE — 99213 OFFICE O/P EST LOW 20 MIN: CPT | Mod: 95 | Performed by: STUDENT IN AN ORGANIZED HEALTH CARE EDUCATION/TRAINING PROGRAM

## 2020-10-14 RX ORDER — NAPROXEN 500 MG/1
500 TABLET ORAL 2 TIMES DAILY WITH MEALS
Qty: 60 TABLET | Refills: 0 | Status: SHIPPED | OUTPATIENT
Start: 2020-10-14 | End: 2020-11-16

## 2020-10-14 RX ORDER — ACETAMINOPHEN 500 MG
500 TABLET ORAL EVERY 6 HOURS PRN
Qty: 60 TABLET | Refills: 3 | Status: CANCELLED | OUTPATIENT
Start: 2020-10-14

## 2020-10-14 ASSESSMENT — PAIN SCALES - GENERAL: PAINLEVEL: MODERATE PAIN (5)

## 2020-10-14 ASSESSMENT — MIFFLIN-ST. JEOR: SCORE: 1685.46

## 2020-10-14 NOTE — PROGRESS NOTES
Preceptor Attestation:    I talked to the patient on the phone and discussed the patient with the resident. I have verified the content of the note, which accurately reflects my assessment of the patient and the plan of care.   Supervising Physician:  Shanon Garcia MD.
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
## 2020-10-19 NOTE — PATIENT INSTRUCTIONS
10/19/20   SLEEP EVALUATION & MANAGEMENT REFERRAL  [facility]  Phone: [phone]  Fax: [phone]    Referral, demographics and medication list faxed to [facility] at [phone] who will contact patient to schedule.    Conchita Leong

## 2020-11-16 DIAGNOSIS — M54.2 NECK PAIN: ICD-10-CM

## 2020-11-16 RX ORDER — NAPROXEN 500 MG/1
TABLET ORAL
Qty: 60 TABLET | Refills: 0 | Status: SHIPPED | OUTPATIENT
Start: 2020-11-16 | End: 2021-08-13

## 2021-08-13 ENCOUNTER — OFFICE VISIT (OUTPATIENT)
Dept: FAMILY MEDICINE | Facility: CLINIC | Age: 51
End: 2021-08-13
Payer: COMMERCIAL

## 2021-08-13 VITALS
WEIGHT: 173 LBS | HEART RATE: 82 BPM | SYSTOLIC BLOOD PRESSURE: 130 MMHG | RESPIRATION RATE: 16 BRPM | TEMPERATURE: 98.8 F | BODY MASS INDEX: 27.1 KG/M2 | OXYGEN SATURATION: 95 % | DIASTOLIC BLOOD PRESSURE: 85 MMHG

## 2021-08-13 DIAGNOSIS — H00.012 HORDEOLUM EXTERNUM OF RIGHT LOWER EYELID: Primary | ICD-10-CM

## 2021-08-13 DIAGNOSIS — M54.2 NECK PAIN: ICD-10-CM

## 2021-08-13 PROCEDURE — 99213 OFFICE O/P EST LOW 20 MIN: CPT | Mod: GC | Performed by: STUDENT IN AN ORGANIZED HEALTH CARE EDUCATION/TRAINING PROGRAM

## 2021-08-13 RX ORDER — ERYTHROMYCIN 5 MG/G
0.5 OINTMENT OPHTHALMIC 2 TIMES DAILY
Qty: 3.5 G | Refills: 0 | Status: SHIPPED | OUTPATIENT
Start: 2021-08-13 | End: 2022-05-12

## 2021-08-13 RX ORDER — NAPROXEN 500 MG/1
TABLET ORAL
Qty: 60 TABLET | Refills: 0 | Status: SHIPPED | OUTPATIENT
Start: 2021-08-13 | End: 2021-09-14

## 2021-08-13 NOTE — PROGRESS NOTES
Preceptor attestation:  Vital signs reviewed: /85 (BP Location: Left arm)   Pulse 82   Temp 98.8  F (37.1  C) (Oral)   Resp 16   Wt 78.5 kg (173 lb)   SpO2 95%   BMI 27.10 kg/m      Patient seen, evaluated, and discussed with the resident.  I have verified the content of the note, which accurately reflects my assessment of the patient and the plan of care.    Supervising physician: Samantha Nicholson MD  [facility]

## 2021-08-13 NOTE — PROGRESS NOTES
Assessment & Plan     Hordeolum externum of right lower eyelid  Does not appear to be grossly infected but continues to have some clear/white drainage.  Encouraged him to continue to use warm compresses at this time but will add on additional erythromycin ointment treatment.  We will place referral to ophthalmology and encouraged him to keep this appointment if his diet resolves on its own.  Discussed red flag symptoms that should send him to the emergency room over the weekend.  - erythromycin (ROMYCIN) 5 MG/GM ophthalmic ointment; Place 0.5 inches into the right eye 2 times daily  - Adult Eye Referral; Future    Neck pain  Chronic.  He is requesting refill of his medication.  - naproxen (NAPROSYN) 500 MG tablet; TAKE 1 TABLET(500 MG) BY MOUTH TWICE DAILY WITH MEALS    Prescription drug management  15 minutes spent on the date of the encounter doing chart review, history and exam, documentation and further activities per the note       Return if symptoms worsen or fail to improve.    Kari Downing MD PGY3  [facility]    Luis Conley is a 51 year old who presents for the following health issues:    HPI     He reports that over the weekend there was some swelling on the top of his lid.  This was draining some white but clear fluid and has since resolved.  However on Tuesday he started to notice some swelling of his bottom inner lid on the right side.  It is mildly painful and is sometimes obstructing his vision.  He thinks it is continuing to grow and has been using warm compresses on the area.  He is also noticed that a few small white dots have emerged on top of the main area of swelling.  He says that there was some minimal swelling around his eye that has gotten better over the course of the week.  It is painful to touch but not warm.  The one on his lower lid has been slowly draining but continues to be bothersome to him.    Denies fever, no runny nose, no ear ache.  No  change in vision.  No issues with his other eye.  He states that he has had styes in the past with similar presentations but it has been over 10 years since this is happened.    Review of Systems   Constitutional, HEENT, cardiovascular, pulmonary, gi and gu systems are negative, except as otherwise noted.      Objective    /85 (BP Location: Left arm)   Pulse 82   Temp 98.8  F (37.1  C) (Oral)   Resp 16   Wt 78.5 kg (173 lb)   SpO2 95%   BMI 27.10 kg/m    Body mass index is 27.1 kg/m .  Physical Exam   GENERAL: healthy, alert and no distress  EYES: eyelids- 5x7 mm style notable on medial lower lid of right eye; minimal conjunctival injection in that eye; normal left eye and lid  RESP:  breathing comfortably on room air  CV: Appears well-perfused  MS: no gross musculoskeletal defects noted, no edema

## 2021-08-13 NOTE — PATIENT INSTRUCTIONS
Continue with warm compresses.     Try erythomycin ointment.     Go to ED if changes in vision.     Ophthalmology will call to schedule an appointment. Ok to cancel if it resolves on its own.     08/18/21   EYE REFERRAL     [facility]  Phone:[phone]  Fax:  [phone]    Faxed demographics and referral to [phone]. They will contact patient to schedule.     Conchita Leong

## 2021-09-13 DIAGNOSIS — M54.2 NECK PAIN: ICD-10-CM

## 2021-09-14 RX ORDER — NAPROXEN 500 MG/1
TABLET ORAL
Qty: 90 TABLET | Refills: 3 | Status: SHIPPED | OUTPATIENT
Start: 2021-09-14 | End: 2022-05-12

## 2022-03-17 ENCOUNTER — ANCILLARY PROCEDURE (OUTPATIENT)
Dept: GENERAL RADIOLOGY | Facility: CLINIC | Age: 52
End: 2022-03-17
Attending: FAMILY MEDICINE
Payer: COMMERCIAL

## 2022-03-17 ENCOUNTER — OFFICE VISIT (OUTPATIENT)
Dept: FAMILY MEDICINE | Facility: CLINIC | Age: 52
End: 2022-03-17
Payer: COMMERCIAL

## 2022-03-17 VITALS
HEIGHT: 66 IN | BODY MASS INDEX: 29.22 KG/M2 | HEART RATE: 86 BPM | RESPIRATION RATE: 16 BRPM | WEIGHT: 181.8 LBS | OXYGEN SATURATION: 92 % | SYSTOLIC BLOOD PRESSURE: 144 MMHG | TEMPERATURE: 98.4 F | DIASTOLIC BLOOD PRESSURE: 97 MMHG

## 2022-03-17 DIAGNOSIS — Z23 HIGH PRIORITY FOR 2019-NCOV VACCINE: ICD-10-CM

## 2022-03-17 DIAGNOSIS — M79.642 PAIN OF LEFT HAND: ICD-10-CM

## 2022-03-17 DIAGNOSIS — Z23 NEED FOR PROPHYLACTIC VACCINATION AND INOCULATION AGAINST INFLUENZA: ICD-10-CM

## 2022-03-17 DIAGNOSIS — S46.001D INJURY OF RIGHT ROTATOR CUFF, SUBSEQUENT ENCOUNTER: Primary | ICD-10-CM

## 2022-03-17 PROCEDURE — 90686 IIV4 VACC NO PRSV 0.5 ML IM: CPT | Performed by: STUDENT IN AN ORGANIZED HEALTH CARE EDUCATION/TRAINING PROGRAM

## 2022-03-17 PROCEDURE — 99214 OFFICE O/P EST MOD 30 MIN: CPT | Mod: 25 | Performed by: STUDENT IN AN ORGANIZED HEALTH CARE EDUCATION/TRAINING PROGRAM

## 2022-03-17 PROCEDURE — 0011A COVID-19,PF,MODERNA (18+ YRS PRIMARY SERIES .5ML): CPT | Performed by: STUDENT IN AN ORGANIZED HEALTH CARE EDUCATION/TRAINING PROGRAM

## 2022-03-17 PROCEDURE — 90471 IMMUNIZATION ADMIN: CPT | Performed by: STUDENT IN AN ORGANIZED HEALTH CARE EDUCATION/TRAINING PROGRAM

## 2022-03-17 PROCEDURE — 73100 X-RAY EXAM OF WRIST: CPT | Mod: LT | Performed by: RADIOLOGY

## 2022-03-17 PROCEDURE — 73120 X-RAY EXAM OF HAND: CPT | Mod: LT | Performed by: RADIOLOGY

## 2022-03-17 NOTE — PATIENT INSTRUCTIONS
Thank you for coming into the clinic today!  Here is what we discussed:    Expect to be contacted regarding physical therapy and MRI scheduling     Use Naproxen as needed for pain     We will contact you with Xray results when they come in     If you have any questions, please call the clinic at [phone].

## 2022-03-17 NOTE — PROGRESS NOTES
Preceptor Attestation:    I discussed the patient with the resident and evaluated the patient in person. I personally reviewed the imaging and agree with the interpretation documented by the resident. I have verified the content of the note, which accurately reflects my assessment of the patient and the plan of care.   Supervising Physician:  Dex Chen MD.

## 2022-03-17 NOTE — PROGRESS NOTES
Assessment & Plan     Injury of right rotator cuff, subsequent encounter  Injured few months ago, exam suspicious for rotator cuff tear.  Naproxen as needed for pain, physical therapy, and MRI shoulder.  Patient to return in 4 weeks for reevaluation  - naproxen (NAPROSYN) 375 MG tablet  Dispense: 30 tablet; Refill: 0  - MR Shoulder Right w/o Contrast  - Physical Therapy Referral    Pain of left hand  X-rays as below, naproxen as needed.  Low concern for fracture based off of exam but will order x-ray to rule out.  Addendum: Normal x-rays  - naproxen (NAPROSYN) 375 MG tablet  Dispense: 30 tablet; Refill: 0  - XR Hand Left 2 Views  - XR Wrist Left 2 Views    High priority for 2019-nCoV vaccine  Covid shot  - COVID-19,PF,MODERNA (18+ Yrs Primary Series .5mL)    Need for prophylactic vaccination and inoculation against influenza  Flu shot  - INFLUENZA VACCINE IM > 6 MONTHS VALENT IIV4 (AFLURIA/FLUZONE)    Ordering of each unique test     Tobacco Cessation:   reports that he has been smoking cigarettes. He has been smoking about 0.50 packs per day. He has never used smokeless tobacco.  Tobacco Cessation Action Plan: Information offered: Patient not interested at this time    Patient Instructions   Thank you for coming into the clinic today!  Here is what we discussed:    Expect to be contacted regarding physical therapy and MRI scheduling     Use Naproxen as needed for pain     We will contact you with Xray results when they come in     If you have any questions, please call the clinic at [phone].         Return in about 4 weeks (around 4/14/2022) for L hand and R shoulder follow up .    Options for treatment and follow-up care were reviewed with the patient who was engaged and actively involved in the decision making process, verbalized understanding of the options discussed, and satisfied with the final plan.    Patient was staffed with supervising physician, Dr. Dex Chen, who agrees with the findings and  "plan.     Jean Pierre Marino DO, PGY-2  [facility]    Subjective   Jarvis Velásquez is a 51 year old year old male who presents for the following health issues   History reviewed. No pertinent past medical history.  Patient Active Problem List   Diagnosis     Health care maintenance     Chief Complaint   Patient presents with     RECHECK     would like x-ray on R shoulder/ fall cpouple months ago and it is not healing possible disclocated per pt      other     would like L hand x- ray fall couple weeks ago on ice/ cannot make tight fist and hurt per pt      Imm/Inj     COVID-19 VACCINE     Imm/Inj     Flu Shot   Slipped on ice, hurt R shoulder few months ago when he lost his footing and use of the right shoulder to grab onto something.  Since that incident, he has had pain in right shoulder and decreased strength and range of motion.  Able to perform ADLs with adjustment in his movements.  Mildly improved since onset, but still causing him pain.    Slipped on ice few weeks ago while going out of car, used L hand to grab car door and now can't make a tight fist.  Gradually getting better, but still problematic.    No previous history of truama to affected shoulder.  Denies any bruising or swelling after his slips.    Review of Systems:  Constitutional, HEENT, cardiovascular, pulmonary, GI, , musculoskeletal, neuro, skin, endocrine and psych systems are negative, except as otherwise noted.      Objective    BP (!) 144/97   Pulse 86   Temp 98.4  F (36.9  C) (Oral)   Resp 16   Ht 1.685 m (5' 6.34\")   Wt 82.5 kg (181 lb 12.8 oz)   SpO2 92%   BMI 29.04 kg/m    Body mass index is 29.04 kg/m .    Physical Exam   GENERAL: healthy, alert and no distress  RESP: lungs clear to auscultation - no rales, rhonchi or wheezes  CV: regular rate and rhythm, normal S1 S2, no S3 or S4, no murmur, click or rub, no peripheral edema and peripheral pulses strong  MS: Right shoulder: No muscular atrophy.  Tender at right GH " joint, nontender at right AC joint.  No effusions.  Limited passive range of motion but exam limited due to patient unable to fully relax, approximately 100 to 110 degrees of abduction, controlled passive adduction causing pain.  Limited active range of motion in shoulder abduction and abduction, internal and external rotation.  Pain with resisted internal and external rotation, adduction.  Left hand and wrist: No effusions, bruising, or swelling.  Tender at second and third MCPs, no snuffbox tenderness.  Full active and passive range of motion.  Pain with resisted flexion of second and third digits.  Diminished  strength.  SKIN: no suspicious lesions or rashes    No results found for this visit on 03/17/22.    ----- Service Performed and Documented by Resident or Fellow ------

## 2022-03-17 NOTE — PROGRESS NOTES
Assessment & Plan     There are no diagnoses linked to this encounter.    {[facility] 2021 Documentation (Optional):624257}     Tobacco Cessation:   reports that he has been smoking cigarettes. He has been smoking about 0.50 packs per day. He has never used smokeless tobacco.  {Tobacco Cessation needed for ACO -- Delete if patient is a non-smoker:037631}    There are no Patient Instructions on file for this visit.    No follow-ups on file.    Options for treatment and follow-up care were reviewed with the patient who was engaged and actively involved in the decision making process, verbalized understanding of the options discussed, and satisfied with the final plan.    Patient was staffed with supervising physician, Dr. Dex Chen, who agrees with the findings and plan.     Jean Pierre Marino DO, PGY-2  [facility]    Subjective   Jarvis Velásquez is a 51 year old year old male who presents for the following health issues {ACCOMPANIED BY STATEMENT (Optional):753810}  No past medical history on file.  Patient Active Problem List   Diagnosis     Health care maintenance     No chief complaint on file.       {SUPERLIST (Optional):417987}  {additonal problems for provider to add (Optional):194577}            Review of Systems:  Constitutional, HEENT, cardiovascular, pulmonary, GI, , musculoskeletal, neuro, skin, endocrine and psych systems are negative, except as otherwise noted.      Objective    There were no vitals taken for this visit.  There is no height or weight on file to calculate BMI.    Physical Exam   {Exam List (Optional):913783}    {Diagnostic Test Results (Optional):841244}    {AMBULATORY ATTESTATION (Optional):883933}

## 2022-03-23 ENCOUNTER — TELEPHONE (OUTPATIENT)
Dept: FAMILY MEDICINE | Facility: CLINIC | Age: 52
End: 2022-03-23
Payer: COMMERCIAL

## 2022-03-23 NOTE — TELEPHONE ENCOUNTER
X-ray results below:    EXAM: XR WRIST LEFT 2 VIEW  LOCATION: [facility]  DATE/TIME: 3/17/2022 4:42 PM     INDICATION: Left wrist pain.  COMPARISON: None.                                                                      IMPRESSION: Normal joint spaces and alignment. No fracture.    EXAM: XR HAND LT 2 VW  LOCATION: [facility]  DATE/TIME: 3/17/2022 4:42 PM     INDICATION: Left hand pain.  COMPARISON: None.                                                                      IMPRESSION: Normal joint spaces and alignment. No fracture.    Communicated results to Malika, patient's spouse. No questions. He has been able to schedule his shoulder MRI. ./LR

## 2022-04-24 ENCOUNTER — HOSPITAL ENCOUNTER (OUTPATIENT)
Dept: MRI IMAGING | Facility: CLINIC | Age: 52
Discharge: HOME | End: 2022-04-24
Attending: FAMILY MEDICINE | Admitting: FAMILY MEDICINE
Payer: COMMERCIAL

## 2022-04-24 DIAGNOSIS — S46.001D INJURY OF RIGHT ROTATOR CUFF, SUBSEQUENT ENCOUNTER: ICD-10-CM

## 2022-04-24 PROCEDURE — 73221 MRI JOINT UPR EXTREM W/O DYE: CPT | Mod: RT

## 2022-05-02 ENCOUNTER — HOSPITAL ENCOUNTER (OUTPATIENT)
Dept: PHYSICAL THERAPY | Facility: REHABILITATION | Age: 52
Discharge: HOME | End: 2022-05-02
Attending: FAMILY MEDICINE
Payer: COMMERCIAL

## 2022-05-02 DIAGNOSIS — S46.001D INJURY OF TENDON OF ROTATOR CUFF, RIGHT, SUBSEQUENT ENCOUNTER: Primary | ICD-10-CM

## 2022-05-02 PROCEDURE — 97110 THERAPEUTIC EXERCISES: CPT | Mod: GP

## 2022-05-02 PROCEDURE — 97161 PT EVAL LOW COMPLEX 20 MIN: CPT | Mod: GP

## 2022-05-02 NOTE — PROGRESS NOTES
05/02/22 1300   General Information   Type of Visit Initial OP Ortho PT Evaluation   Start of Care Date 05/02/22   Referring Physician Dex Chen   Orders Evaluate and Treat   Date of Order 03/17/22   Certification Required? Yes   Medical Diagnosis Injury of right rotator cuff, subsequent encounter   Surgical/Medical history reviewed Yes   Body Part(s)   Body Part(s) Shoulder   Presentation and Etiology   Pertinent history of current problem (include personal factors and/or comorbidities that impact the POC) Pt. slipped on ice on out stretched arm around [place] time   Impairments A. Pain;D. Decreased ROM;E. Decreased flexibility;F. Decreased strength and endurance   Functional Limitations perform activities of daily living   Symptom Location Pt. pointing to AC joint area   How/Where did it occur With a fall   Onset date of current episode/exacerbation 03/17/22  (Fell around [place])   Chronicity New   Pain rating (0-10 point scale) Best (/10);Worst (/10)   Best (/10) 0   Worst (/10) 9 or 10   Pain quality A. Sharp;C. Aching;E. Shooting   Frequency of pain/symptoms B. Intermittent  (and with activity)   Pain/symptoms are: Other  (movement)   Pain symptoms comment movement or laying on it at night   Pain/symptoms exacerbated by C. Lifting;G. Certain positions;H. Overhead reach   Pain/symptoms eased by F. Certain positions;C. Rest   Progression of symptoms since onset: Improved  (contstant pain is better)   Current / Previous Interventions   Diagnostic Tests: MRI   MRI Results Results   MRI results Full tear of supraspinatus and 50% tear of infraspinatus   Prior Level of Function   Prior Level of Function-Mobility independent   Prior Level of Function-ADLs independent   Current Level of Function   Current Community Support Family/friend caregiver   Patient role/employment history A. Employed   Employment Comments    Living environment Other  ([employer])   Home/community accessibility 4  stairs in   Current equipment-Gait/Locomotion None   Current equipment-ADL None   Fall Risk Screen   Fall screen completed by PT   Have you fallen 2 or more times in the past year? Yes   Have you fallen and had an injury in the past year? Yes   Timed Up and Go score (seconds) 8.43   Is patient a fall risk? No   Abuse Screen (yes response referral indicated)   Feels Unsafe at Home or Work/School no   Feels Threatened by Someone no   Does Anyone Try to Keep You From Having Contact with Others or Doing Things Outside Your Home? no   Physical Signs of Abuse Present no   System Outcome Measures   Outcome Measures   (SPADI 79)   Shoulder Objective Findings   Side (if bilateral, select both right and left) Right   Cervical Screen (ROM, quadrant) WNL   Thoracic Mobility Screen WNL   Thoracic Outlet Syndrom (Jose Angel, Colt, Pao, Douglas) NT   Shoulder ROM Comment PROM no pain, AROM limited by pain   Pec Minor (supine) Flexibility mild tight ness   Accessory Motion/Joint Mobility Patient has MRI confirmed tear of supraspinatus and partial tear of infraspinatus   Posture mildly rounded shoulder   Right Shoulder Flexion AROM 110   Right Shoulder Flexion PROM WNL   Right Shoulder Abduction AROM 100   Right Shoulder Abduction PROM WNL   Right Shoulder ER AROM 70   Right Shoulder ER PROM 80   Right Shoulder IR AROM 60   Right Shoulder IR PROM 80   Right Shoulder Flexion Strength 4   Right Shoulder Abduction Strength 4   Right Shoulder ER Strength 4   Right Shoulder IR Strength 4   Right Shoulder Extension Strength 5   Right Mid Trapezius Strength 5   Right Lower Trapezius Strength 5   Planned Therapy Interventions   Planned Therapy Interventions joint mobilization;manual therapy;ROM;strengthening;stretching   Planned Modality Interventions   Planned Modality Interventions Ultrasound   Clinical Impression   Criteria for Skilled Therapeutic Interventions Met yes, treatment indicated   PT Diagnosis Right shoulder, weakness, decreased  ROM   Influenced by the following impairments inability to lift arm over head   Functional limitations due to impairments sleeping, washing hair, lifting, reaching   Clinical Presentation Stable/Uncomplicated   Clinical Presentation Rationale per exam findings   Clinical Decision Making (Complexity) Low complexity   Therapy Frequency 1 time/week   Predicted Duration of Therapy Intervention (days/wks) 7 weeks   Risk & Benefits of therapy have been explained Yes   Patient, Family & other staff in agreement with plan of care Yes   Clinical Impression Comments Patient with fall around [place] on out stretch arm resulting in significant shoulder pain.  Pt. had recent MRI showing full tear of supraspinatus and partial tear of infraspinatus.  Pt. unable to reach above head he is also having pain with movement or laying on shoulder   Education Assessment   Preferred Learning Style Pictures/video;Demonstration   ORTHO GOALS   PT Ortho Eval Goals 1;2;3   Ortho Goal 1   Goal Identifier SPADI   Goal Description Improve SPADI from 79 on eval to 69 or lower   Goal Progress Initiated SPADI   Target Date 07/08/22   Ortho Goal 2   Goal Identifier HEP   Goal Description Patient to demonstrate independence with HEP   Goal Progress Initiated HEP   Target Date 07/08/22   Ortho Goal 3   Goal Identifier Sleeping   Goal Description Patient to sleep through night without being awoken by shoulder pain   Target Date 07/08/22   Total Evaluation Time   PT Eval, Low Complexity Minutes (50383) 25   Therapy Certification   Certification date from 05/02/22   Certification date to 06/30/22   Medical Diagnosis Injury of right rotator cuff, subsequent encounter                                                                              [facility]    OUTPATIENT PHYSICAL THERAPY ORTHOPEDIC EVALUATION  PLAN OF TREATMENT FOR OUTPATIENT REHABILITATION  (COMPLETE FOR INITIAL CLAIMS ONLY)  Patient's Last Name, First Name,  M.I.  YOB: 1970  Jarvis Velásquez    Provider s Name:  [facility]   Medical Record No.  [id number]   Start of Care Date:  05/02/22   Onset Date:  03/17/22 (Fell around [place])   Type:     _X__PT   ___OT   ___SLP Medical Diagnosis:  Injury of right rotator cuff, subsequent encounter     PT Diagnosis:  Right shoulder, weakness, decreased ROM   Visits from SOC:  1      _________________________________________________________________________________  Plan of Treatment/Functional Goals:  joint mobilization, manual therapy, ROM, strengthening, stretching     Ultrasound     Goals  Goal Identifier: SPADI  Goal Description: Improve SPADI from 79 on eval to 69 or lower  Target Date: 07/08/22    Goal Identifier: HEP  Goal Description: Patient to demonstrate independence with HEP  Target Date: 07/08/22    Goal Identifier: Sleeping  Goal Description: Patient to sleep through night without being awoken by shoulder pain  Target Date: 07/08/22                                                           Therapy Frequency:  1 time/week  Predicted Duration of Therapy Intervention:  7 weeks    Tee Farrell, PT                 I CERTIFY THE NEED FOR THESE SERVICES FURNISHED UNDER        THIS PLAN OF TREATMENT AND WHILE UNDER MY CARE     (Physician co-signature of this document indicates review and certification of the therapy plan).                     Certification Date From:  05/02/22   Certification Date To:  06/30/22    Referring Provider:  Dex Chen    Initial Assessment        See Epic Evaluation Start of Care Date: 05/02/22

## 2022-05-12 ENCOUNTER — OFFICE VISIT (OUTPATIENT)
Dept: FAMILY MEDICINE | Facility: CLINIC | Age: 52
End: 2022-05-12
Payer: COMMERCIAL

## 2022-05-12 VITALS
SYSTOLIC BLOOD PRESSURE: 130 MMHG | BODY MASS INDEX: 29.08 KG/M2 | DIASTOLIC BLOOD PRESSURE: 88 MMHG | RESPIRATION RATE: 16 BRPM | TEMPERATURE: 98.2 F | OXYGEN SATURATION: 100 % | HEART RATE: 99 BPM | WEIGHT: 182 LBS

## 2022-05-12 DIAGNOSIS — M79.645 PAIN OF FINGER OF LEFT HAND: Primary | ICD-10-CM

## 2022-05-12 DIAGNOSIS — Z23 HIGH PRIORITY FOR 2019-NCOV VACCINE: ICD-10-CM

## 2022-05-12 DIAGNOSIS — M54.2 NECK PAIN: ICD-10-CM

## 2022-05-12 PROCEDURE — 91301 COVID-19,PF,MODERNA (18+ YRS PRIMARY SERIES .5ML): CPT | Performed by: FAMILY MEDICINE

## 2022-05-12 PROCEDURE — 99214 OFFICE O/P EST MOD 30 MIN: CPT | Mod: 25 | Performed by: FAMILY MEDICINE

## 2022-05-12 PROCEDURE — 0012A COVID-19,PF,MODERNA (18+ YRS PRIMARY SERIES .5ML): CPT | Performed by: FAMILY MEDICINE

## 2022-05-12 RX ORDER — NAPROXEN 500 MG/1
TABLET ORAL
Qty: 60 TABLET | Refills: 3 | Status: SHIPPED | OUTPATIENT
Start: 2022-05-12 | End: 2022-11-09

## 2022-05-12 NOTE — LETTER
5/12/2022         RE: Jarvis Velásquze  [address] [place] [address]      Jarvis was seen today for musculoskeletal problem and imm/inj.    Diagnoses and all orders for this visit:    Pain of finger of left hand  -     Orthopedic  Referral; Future    High priority for 2019-nCoV vaccine  -     COVID-19,PF,MODERNA (18+ Yrs Primary Series .5mL)    Neck pain  -     naproxen (NAPROSYN) 500 MG tablet; TAKE 1 TABLET(500 MG) BY MOUTH TWICE DAILY WITH MEALS      I recommended a referral to hand orthopedist for further evaluation and diagnosis.  He is agreeable and request to go to [facility].  I will attempt to copy my notes to them.  He had a COVID booster today and will follow-up when he is ready for other health maintenance interventions.    Total visit time with patient was 25 mins, all of which was face to face MD time, and over 50% of this time was spent in counseling and coordination of care.  Including post-encounter documentation and orders, total encounter time was 32 mins.    Subjective:  This is a 51-year-old who is assigned to me however I have not seen him in some time.  He presents complaining of pain at the base of his left index finger.  He reports that he injured it about 2 months ago.  Since that time he has been aware of a bump at the MCP joint on the dorsal metacarpal side.  He is also complaining of pain on the palmar aspect, again on the metacarpal side proximal to the MCP joint.  He come in for an x-ray about a month ago and was told it was completely normal.  He does not describe trigger finger and is able to flex and extend his index finger without difficulty.  However it hurts when he tries to fully flex it.    He is right-hand dominant and uses both his hands at work.  He is taking naproxen as needed pain and requests that he return to a 500 mg twice daily dose rather than a 375 mg twice daily dose.    Health maintenance:  He does smoke and therefore a pneumococcal  vaccine is recommended which he declines today.  However he will have a COVID booster today instead.  He should have hepatitis C screening at some point but does not want to do that today.  He understands that he also should have a colonoscopy and will call back once he is ready to have this performed.    Objective:  /88 (BP Location: Left arm, Patient Position: Sitting, Cuff Size: Adult Regular)   Pulse 99   Temp 98.2  F (36.8  C) (Oral)   Resp 16   Wt 82.6 kg (182 lb)   SpO2 100%   BMI 29.08 kg/m    His blood pressure is high normal.  He is overweight.  Exam of his left index finger does confirm that he has a bony prominence at the distal aspect of his second metacarpal just proximal to the second MCP joint.  Squeezing this area produces pain on the palmar aspect just proximal to the second MCP joint.  He is able to fully extend the index finger without pain so there is no evidence for tenosynovitis.    I looked at the previously performed x-rays again and cannot see any bony change at the site of the bony prominence.    Jarvis was seen today for musculoskeletal problem and imm/inj.    Diagnoses and all orders for this visit:    Pain of finger of left hand  -     Orthopedic  Referral; Future    High priority for 2019-nCoV vaccine  -     COVID-19,PF,MODERNA (18+ Yrs Primary Series .5mL)    Neck pain  -     naproxen (NAPROSYN) 500 MG tablet; TAKE 1 TABLET(500 MG) BY MOUTH TWICE DAILY WITH MEALS      I recommended a referral to hand orthopedist for further evaluation and diagnosis.  He is agreeable and request to go to [facility].  I will attempt to copy my notes to them.  He had a COVID booster today and will follow-up when he is ready for other health maintenance interventions.    Total visit time with patient was 25 mins, all of which was face to face MD time, and over 50% of this time was spent in counseling and coordination of care.  Including post-encounter documentation and  orders, total encounter time was 32 mins.    Subjective:  This is a 51-year-old who is assigned to me however I have not seen him in some time.  He presents complaining of pain at the base of his left index finger.  He reports that he injured it about 2 months ago.  Since that time he has been aware of a bump at the MCP joint on the dorsal metacarpal side.  He is also complaining of pain on the palmar aspect, again on the metacarpal side proximal to the MCP joint.  He come in for an x-ray about a month ago and was told it was completely normal.  He does not describe trigger finger and is able to flex and extend his index finger without difficulty.  However it hurts when he tries to fully flex it.    He is right-hand dominant and uses both his hands at work.  He is taking naproxen as needed pain and requests that he return to a 500 mg twice daily dose rather than a 375 mg twice daily dose.    Health maintenance:  He does smoke and therefore a pneumococcal vaccine is recommended which he declines today.  However he will have a COVID booster today instead.  He should have hepatitis C screening at some point but does not want to do that today.  He understands that he also should have a colonoscopy and will call back once he is ready to have this performed.    Objective:  /88 (BP Location: Left arm, Patient Position: Sitting, Cuff Size: Adult Regular)   Pulse 99   Temp 98.2  F (36.8  C) (Oral)   Resp 16   Wt 82.6 kg (182 lb)   SpO2 100%   BMI 29.08 kg/m    His blood pressure is high normal.  He is overweight.  Exam of his left index finger does confirm that he has a bony prominence at the distal aspect of his second metacarpal just proximal to the second MCP joint.  Squeezing this area produces pain on the palmar aspect just proximal to the second MCP joint.  He is able to fully extend the index finger without pain so there is no evidence for tenosynovitis.    I looked at the previously performed x-rays again  and cannot see any bony change at the site of the bony prominence.    EXAM: XR HAND LT 2 VW  LOCATION: [facility]  DATE/TIME: 3/17/2022 4:42 PM     INDICATION: Left hand pain.  COMPARISON: None.                                                                      IMPRESSION: Normal joint spaces and alignment. No fracture.        Tee Ng MD

## 2022-05-12 NOTE — PROGRESS NOTES
Jarvis was seen today for musculoskeletal problem and imm/inj.    Diagnoses and all orders for this visit:    Pain of finger of left hand  -     Orthopedic  Referral; Future    High priority for 2019-nCoV vaccine  -     COVID-19,PF,MODERNA (18+ Yrs Primary Series .5mL)    Neck pain  -     naproxen (NAPROSYN) 500 MG tablet; TAKE 1 TABLET(500 MG) BY MOUTH TWICE DAILY WITH MEALS      I recommended a referral to hand orthopedist for further evaluation and diagnosis.  He is agreeable and request to go to [facility].  I will attempt to copy my notes to them.  He had a COVID booster today and will follow-up when he is ready for other health maintenance interventions.    Total visit time with patient was 25 mins, all of which was face to face MD time, and over 50% of this time was spent in counseling and coordination of care.  Including post-encounter documentation and orders, total encounter time was 32 mins.    Subjective:  This is a 51-year-old who is assigned to me however I have not seen him in some time.  He presents complaining of pain at the base of his left index finger.  He reports that he injured it about 2 months ago.  Since that time he has been aware of a bump at the MCP joint on the dorsal metacarpal side.  He is also complaining of pain on the palmar aspect, again on the metacarpal side proximal to the MCP joint.  He come in for an x-ray about a month ago and was told it was completely normal.  He does not describe trigger finger and is able to flex and extend his index finger without difficulty.  However it hurts when he tries to fully flex it.    He is right-hand dominant and uses both his hands at work.  He is taking naproxen as needed pain and requests that he return to a 500 mg twice daily dose rather than a 375 mg twice daily dose.    Health maintenance:  He does smoke and therefore a pneumococcal vaccine is recommended which he declines today.  However he will have a COVID booster  today instead.  He should have hepatitis C screening at some point but does not want to do that today.  He understands that he also should have a colonoscopy and will call back once he is ready to have this performed.    Objective:  /88 (BP Location: Left arm, Patient Position: Sitting, Cuff Size: Adult Regular)   Pulse 99   Temp 98.2  F (36.8  C) (Oral)   Resp 16   Wt 82.6 kg (182 lb)   SpO2 100%   BMI 29.08 kg/m    His blood pressure is high normal.  He is overweight.  Exam of his left index finger does confirm that he has a bony prominence at the distal aspect of his second metacarpal just proximal to the second MCP joint.  Squeezing this area produces pain on the palmar aspect just proximal to the second MCP joint.  He is able to fully extend the index finger without pain so there is no evidence for tenosynovitis.    I looked at the previously performed x-rays again and cannot see any bony change at the site of the bony prominence.    EXAM: XR HAND LT 2 VW  LOCATION: [facility]  DATE/TIME: 3/17/2022 4:42 PM     INDICATION: Left hand pain.  COMPARISON: None.                                                                      IMPRESSION: Normal joint spaces and alignment. No fracture.

## 2022-05-12 NOTE — PATIENT INSTRUCTIONS
EXAM: XR HAND LT 2 VW  LOCATION: [facility]  DATE/TIME: 3/17/2022 4:42 PM     INDICATION: Left hand pain.  COMPARISON: None.                                                                      IMPRESSION: Normal joint spaces and alignment. No fracture.

## 2022-05-19 ENCOUNTER — TRANSFERRED RECORDS (OUTPATIENT)
Dept: HEALTH INFORMATION MANAGEMENT | Facility: CLINIC | Age: 52
End: 2022-05-19
Payer: COMMERCIAL

## 2022-06-13 ENCOUNTER — TELEPHONE (OUTPATIENT)
Dept: PHYSICAL THERAPY | Facility: REHABILITATION | Age: 52
End: 2022-06-13
Payer: COMMERCIAL

## 2022-06-13 NOTE — TELEPHONE ENCOUNTER
Patient missed 3rd visit.  Called and left voicemail.  Patient will need new referral to return to physical therapy.

## 2022-07-06 NOTE — PROGRESS NOTES
[facility]    Outpatient Physical Therapy Discharge Note  Patient: Jarvis Velásquez  : 1970    Beginning/End Dates of Reporting Period: 22      Referring Provider: Dr. Chen     Therapy Diagnosis: R shoulder pain      Client Self Report: See eval    Objective Measurements:  See eval note     Outcome Measures (most recent score):  SPADI 79     Goals:  Goal Identifier SPADI   Goal Description Improve SPADI from 79 on eval to 69 or lower   Target Date 22   Date Met      Progress (detail required for progress note): Initiated SPADI     Goal Identifier HEP   Goal Description Patient to demonstrate independence with HEP   Target Date 22   Date Met      Progress (detail required for progress note): Initiated HEP     Goal Identifier Sleeping   Goal Description Patient to sleep through night without being awoken by shoulder pain   Target Date 22   Date Met      Progress (detail required for progress note):       Goal Identifier     Goal Description     Target Date     Date Met      Progress (detail required for progress note):       Goal Identifier     Goal Description     Target Date     Date Met      Progress (detail required for progress note):       Goal Identifier     Goal Description     Target Date     Date Met      Progress (detail required for progress note):       Goal Identifier     Goal Description     Target Date     Date Met      Progress (detail required for progress note):       Goal Identifier     Goal Description     Target Date     Date Met      Progress (detail required for progress note):             Plan:  Discharge from therapy.    Discharge:    Reason for Discharge: Patient chooses to discontinue therapy.  Pt no showed 3 appointments and was discharged per policy.     Equipment Issued: none     Discharge Plan: Will need a new order to continue therapy.

## 2022-07-06 NOTE — ADDENDUM NOTE
Encounter addended by: Ellen Sosa, PT on: 7/6/2022 1:24 PM   Actions taken: Episode resolved, Pend clinical note, Flowsheet accepted, Clinical Note Signed

## 2022-11-07 ENCOUNTER — OFFICE VISIT (OUTPATIENT)
Dept: FAMILY MEDICINE | Facility: CLINIC | Age: 52
End: 2022-11-07
Payer: COMMERCIAL

## 2022-11-07 VITALS
WEIGHT: 179 LBS | RESPIRATION RATE: 20 BRPM | BODY MASS INDEX: 28.6 KG/M2 | DIASTOLIC BLOOD PRESSURE: 89 MMHG | SYSTOLIC BLOOD PRESSURE: 136 MMHG | HEART RATE: 80 BPM | TEMPERATURE: 98.3 F | OXYGEN SATURATION: 99 %

## 2022-11-07 DIAGNOSIS — Z11.3 SCREEN FOR STD (SEXUALLY TRANSMITTED DISEASE): Primary | ICD-10-CM

## 2022-11-07 PROCEDURE — 36415 COLL VENOUS BLD VENIPUNCTURE: CPT | Performed by: STUDENT IN AN ORGANIZED HEALTH CARE EDUCATION/TRAINING PROGRAM

## 2022-11-07 PROCEDURE — 99213 OFFICE O/P EST LOW 20 MIN: CPT | Mod: GC | Performed by: STUDENT IN AN ORGANIZED HEALTH CARE EDUCATION/TRAINING PROGRAM

## 2022-11-07 PROCEDURE — 87491 CHLMYD TRACH DNA AMP PROBE: CPT | Performed by: STUDENT IN AN ORGANIZED HEALTH CARE EDUCATION/TRAINING PROGRAM

## 2022-11-07 PROCEDURE — 87389 HIV-1 AG W/HIV-1&-2 AB AG IA: CPT | Performed by: STUDENT IN AN ORGANIZED HEALTH CARE EDUCATION/TRAINING PROGRAM

## 2022-11-07 PROCEDURE — 87591 N.GONORRHOEAE DNA AMP PROB: CPT | Performed by: STUDENT IN AN ORGANIZED HEALTH CARE EDUCATION/TRAINING PROGRAM

## 2022-11-07 PROCEDURE — 86780 TREPONEMA PALLIDUM: CPT | Performed by: STUDENT IN AN ORGANIZED HEALTH CARE EDUCATION/TRAINING PROGRAM

## 2022-11-07 NOTE — PROGRESS NOTES
Preceptor Attestation:    I discussed the patient with the resident and evaluated the patient in person. I have verified the content of the note, which accurately reflects my assessment of the patient and the plan of care.   Supervising Physician:  Tay Moncada MD.

## 2022-11-07 NOTE — PROGRESS NOTES
Assessment and Plan    Jarvis was seen today for std.  History of unprotected sex with a significant other.  Symptoms of whitish penile discharge for 1 day.  No previous history of STDs.  Exam revealed no lymphadenopathy or ulcer    Diagnoses and all orders for this visit:    Screen for STD (sexually transmitted disease)  -     HIV Ag/Ab Screen [facility]; Future  -     Syphilis Screen [facility] (RPR/VDRL); Future  -     NEISSERIA GONORRHOEA PCR  -     CHLAMYDIA TRACHOMATIS PCR  -     HIV Ag/Ab Screen [facility]  -     Syphilis Screen [facility] (RPR/VDRL)             Options for treatment and follow-up care were reviewed with the patient. Patient engaged in the decision making process and verbalized understanding of the options discussed and agreed with the final plan.    Patient was staffed with attending physician MD Prosper Forde MD PGY3           HPI       Jarvis Velásquez is a 52 year old year old male w/ PMH of   Patient Active Problem List   Diagnosis     Health care maintenance     Injury of tendon of rotator cuff, right, subsequent encounter    who presents for STDs evaluation.  Patient is stated he has a penile discharge for 1 day, whitish in color.  Patient has been involved in unprotected sex with his significant other.  Denies previous history of STDs, fever, rash, or lumps on his groin area.  Also denies ulcer on his groin area.        Problem, Medication and Allergy Lists were   reviewed and updated if needed.     Patient Active Problem List    Diagnosis Date Noted     Injury of tendon of rotator cuff, right, subsequent encounter 05/02/2022     Priority: Medium     Health care maintenance 07/16/2018     Priority: Medium       Patient is an established patient of this clinic.         Review of Systems:   10 point ROS negative other than as specified above.         Physical Exam:   /89   Pulse 80   Temp 98.3  F (36.8  C) (Oral)   Resp 20   Wt 81.2 kg (179 lb)   SpO2 99%   BMI  28.60 kg/m      Vitals were reviewed and were normal     Exam:  Constitutional: healthy, alert, no distress, and cooperative  Head: Normocephalic. No masses, lesions, tenderness or abnormalities  Neck: Neck supple. No adenopathy.  ENT: throat normal without erythema or exudate  Cardiovascular: RRR w/o audible murmur  Respiratory: bilateral clear lungs w/o wheezing, crackles or rhonchi; breathing comfortably on RA  Gastrointestinal: Abdomen soft, non-tender. BS normal.  : Deferred  Musculoskeletal: extremities normal- no gross deformities noted, gait normal, and normal muscle tone  Skin: no suspicious lesions or rashes  Neurologic: grossly normal CN; normal strength, sensation, & tone  Psychiatric: mentation appears normal and affect normal/bright        Results:    Results from this visitNo results found for any visits on 11/07/22.

## 2022-11-08 ENCOUNTER — TELEPHONE (OUTPATIENT)
Dept: FAMILY MEDICINE | Facility: CLINIC | Age: 52
End: 2022-11-08

## 2022-11-08 DIAGNOSIS — A54.9 GONORRHEA: Primary | ICD-10-CM

## 2022-11-08 LAB
C TRACH DNA SPEC QL NAA+PROBE: NEGATIVE
HIV 1+2 AB+HIV1 P24 AG SERPL QL IA: NONREACTIVE
N GONORRHOEA DNA SPEC QL NAA+PROBE: POSITIVE
T PALLIDUM AB SER QL: NONREACTIVE

## 2022-11-08 NOTE — TELEPHONE ENCOUNTER
Called pt to let him know he tested positive for gonorrhea and negative for chlamydial. Syphilis test still pending. Pt w/ allergy to PCN. He reports break out all over his body. He said he had it as a kid and he has not way of contacting his mother. He reports taking amoxicillin in the past w/ no issues. Pt said he talked to his uncle and it was amoxicillin but PCN that he was allergic to as a child. He broke out in a bunch of hives. Pt notified will talk to provider to see what provider recommends    Called [facility] as well to see what alternative can be offered due to PCN allergy. [facility] recommending what is in the protocol. They will check to see if [facility] has   Gentamicin. Gentamicin is available at [facility].    Talked to Dr. Moncada and he would like to give pt ceftriaxone 500mg IM. Pt notified per CDC Sexually Transmitted Infections Treatment Guidelines, 2021 that the risk for penicillin cross-reactivity is rare (<1%) with third-generation cephalosporins.  Also educated him per protocol below. Pt will let his wife know to make an appointment w/ [facility] to be seen for gonorrhea as well. Pt would like to see provider to f/u on PCN reaction as well. Scheduled him to see Dr. Moncada on 11/9/22.     Routing to provider as FYI. Pt would like to see provider to f/u on PCN reaction.     Rebecca Reyes RN on 11/8/2022 at 3:49 PM      CDC recommended regimens for uncomplicated gonococcal infections, 2020  Regimen for uncomplicated gonococcal infections of the cervix, urethra, or rectum:    Ceftriaxone 500 mg IM as a single dose for persons weighing <150 kg (300 lb)    For persons weighing ?150 kg (300 lb), 1 g of IM ceftriaxone should be administered.  If chlamydial infection has not been excluded, providers should treat for chlamydia with doxycycline 100 mg orally twice daily for 7 days. During pregnancy, azithromycin 1 g as a single dose is recommended to treat chlamydia.  Alternative regimens for  uncomplicated gonococcal infections of the cervix, urethra, or rectum if ceftriaxone is not available:    Gentamicin 240 mg IM as a single dose plus azithromycin 2 g orally as a single dose OR    Cefixime 800 mg orally as a single dose. If treating with cefixime, and chlamydial infection has not been excluded, providers should treat for chlamydia with doxycycline 100 mg orally twice daily for 7 days. During pregnancy, azithromycin 1 g as a single dose is recommended to treat chlamydia.    Recommended regimen for uncomplicated gonococcal infections of the pharynx:    Ceftriaxone 500 mg IM as a single dose for persons weighing <150 kg (300 lb)    For persons weighing ?150 kg (300 lb), 1 g of IM ceftriaxone should be administered.  If chlamydia coinfection is identified when pharyngeal gonorrhea testing is performed, providers should treat for chlamydia with doxycycline 100 mg orally twice a day for 7 days. During pregnancy, azithromycin 1 g as a single dose is recommended to treat chlamydia.  No reliable alternative treatments are available for pharyngeal gonorrhea. For persons with a history of a beta-lactam allergy, a thorough assessment of the reaction is recommended.  For persons with an anaphylactic or other severe reaction (e.g., Ogden Stanley syndrome) to ceftriaxone, consult an infectious disease specialist for an alternative treatment recommendation.  Abbreviation: IM = intramuscular.     CDC. Sexually transmitted diseases treatment guidelines. MMWR Recomm Rep 2015;64(No. RR-3). https://www.cdc.gov/mmwr/preview/mmwrhtml/gl9992d1.htm.Gonorrhea treatment guideline:  Follow up:    Retest all patient in 3 months.  Should be tested for HIV and syphilis as well.   For pharyngeal cases, recheck in 14 days.    Counseling:    No sexual contact for 7 days after treatment with azithromycin is initiated or until after completion of antibiotics with other regimens.    All sexual partners within the past 60 days are  recommended to be evaluated and treated by their provider or [facility] (which is free testing and treatment)..  If no partner within 60 days, then the most recent sexual partner should be notified.     Notify patient that we are required by the [facility] to report all chlamydia infections, for the purpose of assistance with partner treatment.  We can list on this report the appropriate sexual partners that need to be treated, and the Dept of [facility] will contact them confidentially to facilitate evaluation and treatment.    Penicillin allergy:  Reactions to first generation cephalosporins occur in approximately 5%-10% of persons with a history of penicillin allergy and occur less frequently with third-generation cephalosporins. In those persons with a history of penicillin allergy, the use of cephalosporins should be contraindicated only in those with a history of a severe reaction to penicillin (e.g., anaphylaxis, Ogden Stanley syndrome, and toxic epidermal necrolysis).  Persistent infection:  Clinicians who diagnose gonorrhea in a patient with persistent infection after treatment (treatment failure) with the recommended combination therapy regimen should culture relevant clinical specimens and perform antimicrobial susceptibility testing of N. gonorrhae isolates. The treating clinician should consult an infectious disease specialist, an STD/HIV Prevention [facility] (http://www.nnptc.org), or [facility] (telephone: [phone]) for treatment advice, and report the case to CDC through the local or state health department within 24 hours of diagnosis. A test-of-cure should be conducted 1 week after re-treatment, and clinicians should ensure that the patient's sex partners from the preceding 60 days are evaluated promptly with culture and treated as indicated.   Source:  2020 CDC guidelines update to the 2015 guidelines.

## 2022-11-09 ENCOUNTER — OFFICE VISIT (OUTPATIENT)
Dept: FAMILY MEDICINE | Facility: CLINIC | Age: 52
End: 2022-11-09
Payer: COMMERCIAL

## 2022-11-09 VITALS
DIASTOLIC BLOOD PRESSURE: 79 MMHG | OXYGEN SATURATION: 98 % | RESPIRATION RATE: 16 BRPM | BODY MASS INDEX: 28.79 KG/M2 | WEIGHT: 180.2 LBS | TEMPERATURE: 98.6 F | SYSTOLIC BLOOD PRESSURE: 133 MMHG | HEART RATE: 77 BPM

## 2022-11-09 DIAGNOSIS — M54.2 NECK PAIN: ICD-10-CM

## 2022-11-09 DIAGNOSIS — A54.9 GONORRHEA: Primary | ICD-10-CM

## 2022-11-09 PROCEDURE — 99213 OFFICE O/P EST LOW 20 MIN: CPT | Mod: 25 | Performed by: FAMILY MEDICINE

## 2022-11-09 PROCEDURE — 96372 THER/PROPH/DIAG INJ SC/IM: CPT | Performed by: FAMILY MEDICINE

## 2022-11-09 RX ORDER — NAPROXEN 500 MG/1
TABLET ORAL
Qty: 60 TABLET | Refills: 1 | Status: SHIPPED | OUTPATIENT
Start: 2022-11-09 | End: 2023-01-17

## 2022-11-09 NOTE — PROGRESS NOTES
S: Jarvis Velásquez is a 52 year old male who returns for follow up of  GC/treatment ad refills for naproxen   No lesions ,, no complains   GC positive on STI screen   Patients states that main concern today is GC/RX    PMHX/PSHX/MEDS/ALLERGIES/SHX/FHX reviewed and updated in Epic.  Hives due to PNCas child. Took amoxicillin years later without issues    ROS:  General: No fevers, chills  Head: No headache  Ears: No acute change in hearing.    CV: No chest pain or palpitations.  Resp: No shortness of breath.  No cough. No hemoptysis.  GI: No nausea, vomiting, constipation, diarrhea  : No urinary pains    O: /79   Pulse 77   Temp 98.6  F (37  C) (Oral)   Resp 16   Wt 81.7 kg (180 lb 3.2 oz)   SpO2 98%   BMI 28.79 kg/m     Gen:  Well nourished and in NAD    CV:  RRR  - no murmurs, rubs, or gallups,   Pulm:  CTAB, no wheezes/rales/rhonchi, good air entry   ABD: soft, nontender, no masses, no rebound, BS intact throughout  Extrem: no cyanosis, edema or clubbing  Psych: Euthymic    A/P   Gonorrhea/uncomplicated   Rocephin/ceftriaxone 500 mg IM   Followed CDC recommendations/incluidng  Remote hx of PNC allergy-hives     Total of 25 minutes was spent in face to face contact with patient with > 50% in counseling and coordination of care.  Options for treatment and/or follow-up care were reviewed with the patient. Jarvis Velásquez was engaged and actively involved in the decision making process. He verbalized understanding of the options discussed and was satisfied with the final plan.    RTC PCP/coordiantion of care or sooner if develops new or worsening symptoms.    Tay Moncada MD

## 2022-11-09 NOTE — NURSING NOTE
Clinic Administered Medication Documentation    Administrations This Visit     cefTRIAXone (ROCEPHIN) injection 500 mg     Admin Date  11/09/2022 Action  Given Dose  500 mg Route  Intramuscular Site  Right Deltoid Administered By  Arlette Astorga CMA    Ordering Provider: Tay Moncada MD    NDC: [address]-1    Lot#: [id number]    : APOTEX    Patient Supplied?: No                  Injectable Medication Documentation    Patient was given Ceftriaxone Sodium (Rocephin). Prior to medication administration, verified patients identity using patient s name and date of birth. Please see MAR and medication order for additional information. Patient instructed to remain in clinic for 15 minutes.      Was entire vial of medication used? Yes  Vial/Syringe: Single dose vial  Expiration Date:  06/30/2024  Was this medication supplied by the patient? No    Name of provider who requested the medication administration: Dr. Moncada  Name of provider on site (faculty or community preceptor) at the time of performing the medication administration: Dr. Moncada    Date of next administration: One time  Date of next office visit with provider to renew medication plan (must be seen annually): 1 time

## 2023-01-17 DIAGNOSIS — M54.2 NECK PAIN: ICD-10-CM

## 2023-01-17 RX ORDER — NAPROXEN 500 MG/1
TABLET ORAL
Qty: 60 TABLET | Refills: 1 | Status: SHIPPED | OUTPATIENT
Start: 2023-01-17 | End: 2023-04-10

## 2023-04-10 ENCOUNTER — OFFICE VISIT (OUTPATIENT)
Dept: FAMILY MEDICINE | Facility: CLINIC | Age: 53
End: 2023-04-10
Payer: COMMERCIAL

## 2023-04-10 ENCOUNTER — ANCILLARY PROCEDURE (OUTPATIENT)
Dept: GENERAL RADIOLOGY | Facility: CLINIC | Age: 53
End: 2023-04-10
Attending: FAMILY MEDICINE
Payer: COMMERCIAL

## 2023-04-10 VITALS
RESPIRATION RATE: 16 BRPM | HEIGHT: 67 IN | HEART RATE: 87 BPM | DIASTOLIC BLOOD PRESSURE: 78 MMHG | OXYGEN SATURATION: 97 % | BODY MASS INDEX: 28.06 KG/M2 | WEIGHT: 178.8 LBS | SYSTOLIC BLOOD PRESSURE: 126 MMHG | TEMPERATURE: 98.9 F

## 2023-04-10 DIAGNOSIS — Z11.3 SCREEN FOR STD (SEXUALLY TRANSMITTED DISEASE): ICD-10-CM

## 2023-04-10 DIAGNOSIS — R05.3 CHRONIC COUGH: ICD-10-CM

## 2023-04-10 DIAGNOSIS — L80 VITILIGO: ICD-10-CM

## 2023-04-10 DIAGNOSIS — D22.9 BENIGN PIGMENTED MOLE: ICD-10-CM

## 2023-04-10 DIAGNOSIS — Z11.59 NEED FOR HEPATITIS C SCREENING TEST: ICD-10-CM

## 2023-04-10 DIAGNOSIS — Z00.00 ENCOUNTER FOR PREVENTATIVE ADULT HEALTH CARE EXAMINATION: Primary | ICD-10-CM

## 2023-04-10 DIAGNOSIS — N52.9 ERECTILE DYSFUNCTION, UNSPECIFIED ERECTILE DYSFUNCTION TYPE: ICD-10-CM

## 2023-04-10 DIAGNOSIS — M54.2 NECK PAIN: ICD-10-CM

## 2023-04-10 DIAGNOSIS — Z13.220 LIPID SCREENING: ICD-10-CM

## 2023-04-10 DIAGNOSIS — F17.200 NICOTINE DEPENDENCE WITH CURRENT USE: ICD-10-CM

## 2023-04-10 LAB
CHOLEST SERPL-MCNC: 177 MG/DL
HBA1C MFR BLD: 5.3 % (ref 0–5.6)
HCV AB SERPL QL IA: NONREACTIVE
HDLC SERPL-MCNC: 53 MG/DL
HIV 1+2 AB+HIV1 P24 AG SERPL QL IA: NONREACTIVE
LDLC SERPL CALC-MCNC: 68 MG/DL
NONHDLC SERPL-MCNC: 124 MG/DL
T PALLIDUM AB SER QL: NONREACTIVE
TRIGL SERPL-MCNC: 282 MG/DL

## 2023-04-10 PROCEDURE — 90746 HEPB VACCINE 3 DOSE ADULT IM: CPT | Performed by: FAMILY MEDICINE

## 2023-04-10 PROCEDURE — 91312 COVID-19 VACCINE BIVALENT BOOSTER 12+ (PFIZER): CPT | Performed by: FAMILY MEDICINE

## 2023-04-10 PROCEDURE — 90677 PCV20 VACCINE IM: CPT | Performed by: FAMILY MEDICINE

## 2023-04-10 PROCEDURE — 99396 PREV VISIT EST AGE 40-64: CPT | Mod: 25 | Performed by: FAMILY MEDICINE

## 2023-04-10 PROCEDURE — 99214 OFFICE O/P EST MOD 30 MIN: CPT | Mod: 25 | Performed by: FAMILY MEDICINE

## 2023-04-10 PROCEDURE — 90472 IMMUNIZATION ADMIN EACH ADD: CPT | Performed by: FAMILY MEDICINE

## 2023-04-10 PROCEDURE — 87591 N.GONORRHOEAE DNA AMP PROB: CPT | Performed by: FAMILY MEDICINE

## 2023-04-10 PROCEDURE — 86803 HEPATITIS C AB TEST: CPT | Performed by: FAMILY MEDICINE

## 2023-04-10 PROCEDURE — 80061 LIPID PANEL: CPT | Performed by: FAMILY MEDICINE

## 2023-04-10 PROCEDURE — 87389 HIV-1 AG W/HIV-1&-2 AB AG IA: CPT | Performed by: FAMILY MEDICINE

## 2023-04-10 PROCEDURE — 71046 X-RAY EXAM CHEST 2 VIEWS: CPT | Mod: TC | Performed by: RADIOLOGY

## 2023-04-10 PROCEDURE — 87491 CHLMYD TRACH DNA AMP PROBE: CPT | Performed by: FAMILY MEDICINE

## 2023-04-10 PROCEDURE — 90471 IMMUNIZATION ADMIN: CPT | Performed by: FAMILY MEDICINE

## 2023-04-10 PROCEDURE — 0124A COVID-19 VACCINE BIVALENT BOOSTER 12+ (PFIZER): CPT | Performed by: FAMILY MEDICINE

## 2023-04-10 PROCEDURE — 36415 COLL VENOUS BLD VENIPUNCTURE: CPT | Performed by: FAMILY MEDICINE

## 2023-04-10 PROCEDURE — 83036 HEMOGLOBIN GLYCOSYLATED A1C: CPT | Performed by: FAMILY MEDICINE

## 2023-04-10 PROCEDURE — 86780 TREPONEMA PALLIDUM: CPT | Performed by: FAMILY MEDICINE

## 2023-04-10 RX ORDER — GUAIFENESIN 600 MG/1
1200 TABLET, EXTENDED RELEASE ORAL 2 TIMES DAILY
Qty: 40 TABLET | Refills: 0 | Status: SHIPPED | OUTPATIENT
Start: 2023-04-10 | End: 2023-04-20

## 2023-04-10 RX ORDER — NAPROXEN 500 MG/1
500 TABLET ORAL 2 TIMES DAILY WITH MEALS
Qty: 60 TABLET | Refills: 1 | Status: SHIPPED | OUTPATIENT
Start: 2023-04-10 | End: 2023-11-30

## 2023-04-10 RX ORDER — CLARITHROMYCIN 500 MG
500 TABLET ORAL 2 TIMES DAILY
Qty: 20 TABLET | Refills: 0 | Status: SHIPPED | OUTPATIENT
Start: 2023-04-10 | End: 2023-04-20

## 2023-04-10 RX ORDER — SILDENAFIL 100 MG/1
100 TABLET, FILM COATED ORAL DAILY PRN
Qty: 30 TABLET | Refills: 3 | Status: SHIPPED | OUTPATIENT
Start: 2023-04-10

## 2023-04-10 ASSESSMENT — PATIENT HEALTH QUESTIONNAIRE - PHQ9
SUM OF ALL RESPONSES TO PHQ QUESTIONS 1-9: 8
10. IF YOU CHECKED OFF ANY PROBLEMS, HOW DIFFICULT HAVE THESE PROBLEMS MADE IT FOR YOU TO DO YOUR WORK, TAKE CARE OF THINGS AT HOME, OR GET ALONG WITH OTHER PEOPLE: SOMEWHAT DIFFICULT
SUM OF ALL RESPONSES TO PHQ QUESTIONS 1-9: 8

## 2023-04-10 ASSESSMENT — ENCOUNTER SYMPTOMS
HEMATOCHEZIA: 0
DIARRHEA: 0
ABDOMINAL PAIN: 0
JOINT SWELLING: 0
ARTHRALGIAS: 1
DYSURIA: 0
CONSTIPATION: 0
FEVER: 0
NERVOUS/ANXIOUS: 1
COUGH: 1
NAUSEA: 0
DIZZINESS: 1
SHORTNESS OF BREATH: 1
HEMATURIA: 0
MYALGIAS: 1
CHILLS: 0
FREQUENCY: 0
PALPITATIONS: 1
PARESTHESIAS: 1
HEARTBURN: 1
SORE THROAT: 0
HEADACHES: 0
WEAKNESS: 1

## 2023-04-10 NOTE — PROGRESS NOTES
SUBJECTIVE:   CC: Jarvis is an 52 year old who presents for preventative health visit.       4/10/2023    10:49 AM   Additional Questions   Roomed by Sakshi   Accompanied by patient(self)     Healthy Habits:     Getting at least 3 servings of Calcium per day:  NO    Bi-annual eye exam:  Yes    Dental care twice a year:  NO    Sleep apnea or symptoms of sleep apnea:  Daytime drowsiness    Diet:  Regular (no restrictions)    Frequency of exercise:  None    Taking medications regularly:  Not Applicable    PHQ-2 Total Score: 2    Additional concerns today:  Yes      Today's PHQ-2 Score:       4/10/2023    10:45 AM   PHQ-2 ( 1999 Pfizer)   Q1: Little interest or pleasure in doing things 2   Q2: Feeling down, depressed or hopeless 1   PHQ-2 Score 3   Q1: Little interest or pleasure in doing things More than half the days   Q2: Feeling down, depressed or hopeless Several days   PHQ-2 Score 3     PHQ-9 score:        4/10/2023    10:45 AM   PHQ   PHQ-9 Total Score 8   Q9: Thoughts of better off dead/self-harm past 2 weeks Not at all     Have you ever done Advance Care Planning? (For example, a Health Directive, POLST, or a discussion with a medical provider or your loved ones about your wishes): No, advance care planning information given to patient to review.  Patient declined advance care planning discussion at this time.    Social History     Tobacco Use     Smoking status: Every Day     Packs/day: 0.50     Types: Cigarettes     Smokeless tobacco: Never   Vaping Use     Vaping status: Not on file   Substance Use Topics     Alcohol use: No           4/10/2023    10:45 AM   Alcohol Use   Prescreen: >3 drinks/day or >7 drinks/week? No       Last PSA: No results found for: PSA    Reviewed orders with patient. Reviewed health maintenance and updated orders accordingly - Yes    Reviewed and updated as needed this visit by clinical staff   Tobacco  Allergies  Meds              Reviewed and updated as needed this visit by  Provider                     Review of Systems   Constitutional: Negative for chills and fever.   HENT: Positive for congestion. Negative for hearing loss and sore throat.    Eyes: Negative for visual disturbance.   Respiratory: Positive for cough and shortness of breath.    Cardiovascular: Positive for palpitations. Negative for chest pain and peripheral edema.   Gastrointestinal: Positive for heartburn. Negative for abdominal pain, constipation, diarrhea, hematochezia and nausea.   Genitourinary: Positive for impotence and urgency. Negative for dysuria, frequency, genital sores, hematuria and penile discharge.   Musculoskeletal: Positive for arthralgias and myalgias. Negative for joint swelling.   Skin: Negative for rash.   Neurological: Positive for dizziness, weakness and paresthesias. Negative for headaches.   Psychiatric/Behavioral: Positive for mood changes. The patient is nervous/anxious.      Patient has too many concerns that can be addressed in a health maintenance focused encounter.  Reports that his main concerns are:  1.  That he is now smoking a pack of cigarettes per day which is about the highest he has done.  Is interested in some nicotine aids to help him stop.  2.  He has a cough and ends up having a fit of coughing resulting in him feeling like he is about to blackout.  Seems like the primary problem is the cough which is productive of sputum.  3.  He gets various aches and pains in his muscles.  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  4.  He has a mole on the right side of his cheek that his wife is worried about and wants him to get checked out.  5. Wants refill of ED medication.  Says he can not get an erection sometimes - wants a higher dose of medication.  Also knows he needs to Follow-up on previous gonorrhea result.    He agrees to follow-up at some point for his other listed concerns.    OBJECTIVE:   /78 (BP Location: Left arm, Patient  "Position: Sitting, Cuff Size: Adult Regular)   Pulse 87   Temp 98.9  F (37.2  C) (Oral)   Resp 16   Ht 1.708 m (5' 7.25\")   Wt 81.1 kg (178 lb 12.8 oz)   SpO2 97%   BMI 27.80 kg/m      Physical Exam   BP is mildly elevated on 2 occasions  GENERAL: healthy, alert and no distress  EYES: Eyes grossly normal to inspection, PERRL and conjunctivae and sclerae normal  HENT: ear canals and TM's normal, nose and mouth without ulcers or lesions  NECK: no adenopathy, no asymmetry, masses, or scars and thyroid normal to palpation  RESP: lungs clear to auscultation - no rales, rhonchi or wheezes  CV: regular rate and rhythm, normal S1 S2, no S3 or S4, no murmur, click or rub, no peripheral edema and peripheral pulses strong  ABDOMEN: soft, nontender, no hepatosplenomegaly, no masses and bowel sounds normal  MS: no gross musculoskeletal defects noted, no edema  SKIN: He has patches of pigment loss on both his cheeks where he has applied and anti-gray he shampoo to his beard.  These could be considered iatrogenic vitiligo.  He additionally has what appears to be a benign nevus at the angle of his right jaw however given concern of family members that this is changed, I recommend biopsy.  He also has an area of ichthyosis in his lower abdomen which she says relates to a reaction from his belt buckle and belt. No other suspicious lesions or rashes  NEURO: Normal strength and tone, mentation intact and speech normal  PSYCH: mentation appears normal, affect normal/bright however see PHQ-9 for depressive symptoms not addressed today.    Diagnostic Test Results:  Results for orders placed or performed in visit on 04/10/23   XR CHEST 2 VW     Status: None    Narrative    EXAM: XR CHEST 2 VIEWS  LOCATION: [facility]  DATE/TIME: 4/10/2023 12:06 PM    INDICATION: decreased air entry right lower lungfield  COMPARISON: None.      Impression    IMPRESSION: Negative chest.  The lungs are clear and there are no " pleural effusions. No pneumothorax. Normal heart size.   Results for orders placed or performed in visit on 04/10/23   Hepatitis C Screen Reflex to HCV RNA Quant and Genotype     Status: Normal   Result Value Ref Range    Hepatitis C Antibody Nonreactive Nonreactive    Narrative    Assay performance characteristics have not been established for newborns, infants, and children.   Lipid panel reflex to direct LDL Non-fasting     Status: Abnormal   Result Value Ref Range    Cholesterol 177 <200 mg/dL    Triglycerides 282 (H) <150 mg/dL    Direct Measure HDL 53 >=40 mg/dL    LDL Cholesterol Calculated 68 <=100 mg/dL    Non HDL Cholesterol 124 <130 mg/dL    Narrative    Cholesterol  Desirable:  <200 mg/dL    Triglycerides  Normal:  Less than 150 mg/dL  Borderline High:  150-199 mg/dL  High:  200-499 mg/dL  Very High:  Greater than or equal to 500 mg/dL    Direct Measure HDL  Female:  Greater than or equal to 50 mg/dL   Male:  Greater than or equal to 40 mg/dL    LDL Cholesterol  Desirable:  <100mg/dL  Above Desirable:  100-129 mg/dL   Borderline High:  130-159 mg/dL   High:  160-189 mg/dL   Very High:  >= 190 mg/dL    Non HDL Cholesterol  Desirable:  130 mg/dL  Above Desirable:  130-159 mg/dL  Borderline High:  160-189 mg/dL  High:  190-219 mg/dL  Very High:  Greater than or equal to 220 mg/dL   HIV Antigen Antibody Combo     Status: Normal   Result Value Ref Range    HIV Antigen Antibody Combo Nonreactive Nonreactive   Treponema Abs w Reflex to RPR and Titer     Status: Normal   Result Value Ref Range    Treponema Antibody Total Nonreactive Nonreactive   Hemoglobin A1c     Status: Normal   Result Value Ref Range    Hemoglobin A1C 5.3 0.0 - 5.6 %   Neisseria gonorrhoeae PCR     Status: Normal    Specimen: Urine, Voided   Result Value Ref Range    Neisseria gonorrhoeae Negative Negative   Chlamydia Trachomatis PCR     Status: Normal    Specimen: Urine, Voided   Result Value Ref Range    Chlamydia trachomatis Negative  Negative           ASSESSMENT/PLAN:   Jarvis was seen today for physical, derm problem and medication reconciliation.    Diagnoses and all orders for this visit:    Encounter for preventative adult health care examination    Need for hepatitis C screening test  -     Hepatitis C Screen Reflex to HCV RNA Quant and Genotype; Future  -     Hepatitis C Screen Reflex to HCV RNA Quant and Genotype    Lipid screening  -     Lipid panel reflex to direct LDL Non-fasting; Future  -     Lipid panel reflex to direct LDL Non-fasting    Erectile dysfunction, unspecified erectile dysfunction type  -     sildenafil (VIAGRA) 100 MG tablet; Take 1 tablet (100 mg) by mouth daily as needed (ED) 30 min to 4 hrs before sex. Do not use with nitroglycerin, terazosin or doxazosin.  -     Hemoglobin A1c; Future  -     Hemoglobin A1c    Neck pain  -     naproxen (NAPROSYN) 500 MG tablet; Take 1 tablet (500 mg) by mouth 2 times daily (with meals)    Screen for STD (sexually transmitted disease)  -     Neisseria gonorrhoeae PCR; Future  -     Chlamydia Trachomatis PCR; Future  -     HIV Antigen Antibody Combo; Future  -     Treponema Abs w Reflex to RPR and Titer; Future  -     Neisseria gonorrhoeae PCR  -     Chlamydia Trachomatis PCR  -     HIV Antigen Antibody Combo  -     Treponema Abs w Reflex to RPR and Titer    Nicotine dependence with current use  -     nicotine (NICOTROL) 10 MG inhaler; Use 1 cartridge as needed for urge to smoke by puffing over course of 20min.  Use 6-16 cart/day; reduce number of cart/day over 6-12 weeks.  -     nicotine (NICORETTE) 4 MG lozenge; Place 1 lozenge (4 mg) inside cheek every hour as needed for smoking cessation    Vitiligo  -     Adult Dermatology Referral; Future    Benign pigmented mole  -     Adult Dermatology Referral; Future    Chronic cough  -     XR CHEST 2 VW; Future  -     guaiFENesin (MUCINEX) 600 MG 12 hr tablet; Take 2 tablets (1,200 mg) by mouth 2 times daily for 10 days  -      clarithromycin (BIAXIN) 500 MG tablet; Take 1 tablet (500 mg) by mouth 2 times daily for 10 days    Other orders  -     HEPATITIS B VACCINE ADULT 3 DOSE IM (ENGERIX-B/RECOMBIVAX HB)  -     Pneumococcal 20 Valent Conjugate (Prevnar 20)  -     COVID-19,PF,PFIZER BOOSTER BIVALENT (12+YRS)      I recommended biopsying the nevus on his right cheek however since he is going to dermatology and since a biopsy would remove some of his beard, he prefers to delay this until he sees dermatology.  He clearly has had a significant skin change related to an OTC hair product and tells me that he plans to contact an  about this.    Concerning his cough and his experience of post tussive lightheadedness and since he is a heavy smoker, I recommend a chest x-ray today.  Review of this by me reveals no significant changes.  However based on his symptoms he certainly may have a persistent lower respiratory infection and I therefore prescribed a broad-spectrum antibiotic along with a mucolytic to see if this would help.    In addition we spent several minutes discussing smoking cessation and reviewing the various treatment options available to him.  He prefers to try the nicotine inhaler however by the time of dictation I have heard from his pharmacy that this was not covered by his insurance until he has first tried other nicotine products.  I will therefore send nicotine lozenges for him and if these are ineffective we can try again with the inhaler.  He does not yet meet criteria for [place] lung cancer screening given that he has been able to quit intermittently over the years and previously smoked lesser amounts than he currently does.    We agreed to check a number of health maintenance labs and I will notify him of the results.  At the time of dictation his lipids have returned at a level that does not yet warrant commencing the statin but he is advised to try to eat a lower fat diet and we can recheck this annually.    I have  "refilled sildenafil at a higher dose for him and have advised him to notify me if this is cost prohibitive in which case we can use a different dose of sildenafil.  I did provide him with a good Rx coupon for this medication.    The 10-year ASCVD risk score (Amelia VARGAS, et al., 2019) is: 6.4%    Values used to calculate the score:      Age: 52 years      Sex: Male      Is Non- : No      Diabetic: No      Tobacco smoker: Yes      Systolic Blood Pressure: 126 mmHg      Is BP treated: No      HDL Cholesterol: 53 mg/dL      Total Cholesterol: 177 mg/dL    COUNSELING:   Reviewed preventive health counseling, as reflected in patient instructions  Special attention given to:        Regular exercise       Healthy diet/nutrition       Immunizations    Vaccinated for: Covid-19, Hepatitis B and Pneumococcal             Safe sex practices/STD prevention       Consider Hep C screening for all patients one time for ages 18-79 years       Syphilis screening for high risk patients        HIV screening       Colorectal cancer screening - discussed - prefers to delay colonoscopy at this time      BMI:   Estimated body mass index is 27.8 kg/m  as calculated from the following:    Height as of this encounter: 1.708 m (5' 7.25\").    Weight as of this encounter: 81.1 kg (178 lb 12.8 oz).   Weight management plan: Discussed healthy diet and exercise guidelines      He reports that he has been smoking cigarettes. He has been smoking an average of .5 packs per day. He has never used smokeless tobacco.  Nicotine/Tobacco Cessation Plan:   Pharmacotherapies : Nicotine inhaler        Tee Ng MD  [facility]  Answers for HPI/ROS submitted by the patient on 4/10/2023  If you checked off any problems, how difficult have these problems made it for you to do your work, take care of things at home, or get along with other people?: Somewhat difficult  PHQ9 TOTAL SCORE: 8      "

## 2023-04-10 NOTE — NURSING NOTE
Prior to immunization administration, verified patients identity using patient s name and date of birth. Please see Immunization Activity for additional information.     Screening Questionnaire for Adult Immunization    Are you sick today?   No   Do you have allergies to medications, food, a vaccine component or latex?   Yes   Have you ever had a serious reaction after receiving a vaccination?   No   Do you have a long-term health problem with heart, lung, kidney, or metabolic disease (e.g., diabetes), asthma, a blood disorder, no spleen, complement component deficiency, a cochlear implant, or a spinal fluid leak?  Are you on long-term aspirin therapy?   No   Do you have cancer, leukemia, HIV/AIDS, or any other immune system problem?   No   Do you have a parent, brother, or sister with an immune system problem?   No   In the past 3 months, have you taken medications that affect  your immune system, such as prednisone, other steroids, or anticancer drugs; drugs for the treatment of rheumatoid arthritis, Crohn s disease, or psoriasis; or have you had radiation treatments?   No   Have you had a seizure, or a brain or other nervous system problem?   Yes   During the past year, have you received a transfusion of blood or blood    products, or been given immune (gamma) globulin or antiviral drug?   No   For women: Are you pregnant or is there a chance you could become       pregnant during the next month?   No   Have you received any vaccinations in the past 4 weeks?   No     Immunization questionnaire was positive for at least one answer.  Notified Dr. Ng.      Injection of HepB, PCV20 and Pfizer Bivalent given by Sakshi Reyes CMA. Patient instructed to remain in clinic for 15 minutes afterwards, and to report any adverse reactions.     Screening performed by Sakshi Reyes CMA on 4/10/2023 at 11:54 AM.

## 2023-04-10 NOTE — LETTER
April 11, 2023      Jarvisbossman Velásquez  [address] [place] [address]        Dear ,    We are writing to inform you of your test results.    Good news that your lungs look clear on chest Xray.  Still a good idea to cut down how much you are smoking and hopefully quit again.  I have sent a prescription for an antibiotic and pills that help you cough up your phlegm to your pharmacy - hopefully they help your cough clear up.     Resulted Orders   XR CHEST 2 VW    Narrative    EXAM: XR CHEST 2 VIEWS  LOCATION: [facility]  DATE/TIME: 4/10/2023 12:06 PM    INDICATION: decreased air entry right lower lungfield  COMPARISON: None.      Impression    IMPRESSION: Negative chest.  The lungs are clear and there are no pleural effusions. No pneumothorax. Normal heart size.       If you have any questions or concerns, please call the clinic at the number listed above.       Sincerely,      Tee Ng MD

## 2023-04-10 NOTE — LETTER
April 12, 2023      Jarvis Velásquez  [address] [place] [address]        Dear ,    We are writing to inform you of your test results.    Overall these lab results are all good - except for one of your cholesterol readings which are borderline.  You are not diabetic.  All your STI tests are negative.  Your triglycerides (fat) was mildly high - try to limit high fat foods.  Next time we could check this when you've been fasting over 10 hours - OK?  Take care!    The 10-year ASCVD risk score (Amelia VARGAS, et al., 2019) is: 6.4%     Values used to calculate the score:       Age: 52 years       Sex: Male       Is Non- : No       Diabetic: No       Tobacco smoker: Yes       Systolic Blood Pressure: 126 mmHg       Is BP treated: No       HDL Cholesterol: 53 mg/dL       Total Cholesterol: 177 mg/dL     Resulted Orders   Hepatitis C Screen Reflex to HCV RNA Quant and Genotype   Result Value Ref Range    Hepatitis C Antibody Nonreactive Nonreactive    Narrative    Assay performance characteristics have not been established for newborns, infants, and children.   Lipid panel reflex to direct LDL Non-fasting   Result Value Ref Range    Cholesterol 177 <200 mg/dL    Triglycerides 282 (H) <150 mg/dL    Direct Measure HDL 53 >=40 mg/dL    LDL Cholesterol Calculated 68 <=100 mg/dL    Non HDL Cholesterol 124 <130 mg/dL    Narrative    Cholesterol  Desirable:  <200 mg/dL    Triglycerides  Normal:  Less than 150 mg/dL  Borderline High:  150-199 mg/dL  High:  200-499 mg/dL  Very High:  Greater than or equal to 500 mg/dL    Direct Measure HDL  Female:  Greater than or equal to 50 mg/dL   Male:  Greater than or equal to 40 mg/dL    LDL Cholesterol  Desirable:  <100mg/dL  Above Desirable:  100-129 mg/dL   Borderline High:  130-159 mg/dL   High:  160-189 mg/dL   Very High:  >= 190 mg/dL    Non HDL Cholesterol  Desirable:  130 mg/dL  Above Desirable:  130-159 mg/dL  Borderline High:  160-189  mg/dL  High:  190-219 mg/dL  Very High:  Greater than or equal to 220 mg/dL   Neisseria gonorrhoeae PCR   Result Value Ref Range    Neisseria gonorrhoeae Negative Negative      Comment:      Negative for N. gonorrhoeae rRNA by transcription mediated amplification. A negative result by transcription mediated amplification does not preclude the presence of C. trachomatis infection because results are dependent on proper and adequate collection, absence of inhibitors and sufficient rRNA to be detected.   Chlamydia Trachomatis PCR   Result Value Ref Range    Chlamydia trachomatis Negative Negative      Comment:      A negative result by transcription mediated amplification does not preclude the presence of C. trachomatis infection because results are dependent on proper and adequate collection, absence of inhibitors and sufficient rRNA to be detected.   HIV Antigen Antibody Combo   Result Value Ref Range    HIV Antigen Antibody Combo Nonreactive Nonreactive      Comment:      HIV-1 p24 Ag & HIV-1/HIV-2 Ab Not Detected   Treponema Abs w Reflex to RPR and Titer   Result Value Ref Range    Treponema Antibody Total Nonreactive Nonreactive   Hemoglobin A1c   Result Value Ref Range    Hemoglobin A1C 5.3 0.0 - 5.6 %      Comment:      Normal <5.7%   Prediabetes 5.7-6.4%    Diabetes 6.5% or higher     Note: Adopted from ADA consensus guidelines.       If you have any questions or concerns, please call the clinic at the number listed above.       Sincerely,      Tee Ng MD

## 2023-04-10 NOTE — RESULT ENCOUNTER NOTE
Edmundo Conley, good news that your lungs look clear on chest Xray.  Still a good idea to cut down how much you are smoking and hopefully quit again.  I have sent a prescription for an antibiotic and pills that help you cough up your phlegm to your pharmacy - hopefully they help your cough clear up.  Dr Tee Ng

## 2023-04-11 ENCOUNTER — TELEPHONE (OUTPATIENT)
Dept: FAMILY MEDICINE | Facility: CLINIC | Age: 53
End: 2023-04-11

## 2023-04-11 LAB
C TRACH DNA SPEC QL NAA+PROBE: NEGATIVE
N GONORRHOEA DNA SPEC QL NAA+PROBE: NEGATIVE

## 2023-04-11 NOTE — TELEPHONE ENCOUNTER
Central Prior Authorization Team   Phone: [phone]      PRIOR AUTHORIZATION DENIED    Medication: nicotine (NICOTROL) 10 MG inhaler - EPA DENIED    Denial Date: 4/10/2023    Denial Rational: TRY AND FAIL AT LEAST TWO PREFERRED: Examples of formulary alternatives include: BUPROPION HCL SR, CHANTIX, NICORETTE, NICOTINE GUM, NICOTINE LOZENGE          Appeal Information:

## 2023-04-11 NOTE — RESULT ENCOUNTER NOTE
Edmundo Conley, overall these lab results are all good - except for one of your cholesterol readings which are borderline.  You are not diabetic.  All your STI tests are negative.  Your triglycerides (fat) was mildly high - try to limit high fat foods.  Next time we could check this when you've been fasting over 10 hours - OK?  Take care, Dr Tee Ng    The 10-year ASCVD risk score (Amelia VARGAS, et al., 2019) is: 6.4%    Values used to calculate the score:      Age: 52 years      Sex: Male      Is Non- : No      Diabetic: No      Tobacco smoker: Yes      Systolic Blood Pressure: 126 mmHg      Is BP treated: No      HDL Cholesterol: 53 mg/dL      Total Cholesterol: 177 mg/dL

## 2023-11-28 DIAGNOSIS — M54.2 NECK PAIN: ICD-10-CM

## 2023-11-28 NOTE — TELEPHONE ENCOUNTER
[facility] phone call message- patient requesting a refill:    Full Medication Name:     naproxen (NAPROSYN) 500 MG tablet     Dose:     Sig - Route: Take 1 tablet (500 mg) by mouth 2 times daily (with meals) - Oral     Pharmacy confirmed as   [id number] - [place], [place] - [address] [place] [address]  Phone: [phone] Fax: [phone]  : Yes    Additional Comments:     Patient asked for higher dose.     OK to leave a message on voice mail? Yes    Primary language: English      needed? No    Call taken on November 28, 2023 at 11:16 AM by Tenisha Marino

## 2023-12-01 RX ORDER — NAPROXEN 500 MG/1
500 TABLET ORAL 2 TIMES DAILY WITH MEALS
Qty: 60 TABLET | Refills: 1 | Status: SHIPPED | OUTPATIENT
Start: 2023-12-01 | End: 2024-02-05

## 2024-02-04 DIAGNOSIS — M54.2 NECK PAIN: ICD-10-CM

## 2024-02-05 RX ORDER — NAPROXEN 500 MG/1
500 TABLET ORAL 2 TIMES DAILY WITH MEALS
Qty: 60 TABLET | Refills: 1 | Status: SHIPPED | OUTPATIENT
Start: 2024-02-05

## 2024-02-05 NOTE — TELEPHONE ENCOUNTER
Medication requested: NAPROXEN 500MG TABLETS   Last office visit: 4/10/23  Future [facility] appointments: none  Medication last refilled: 1/17/23  Last qualifying labs: none    Routing refill request to provider for review/approval because:  NSAID Medications [id number]/04/2024 12:51 PM   Protocol Details Normal ALT on file in past 12 months    Normal AST on file in past 12 months    Normal CBC on file in past 12 months    Always Fail Criteria - Chart Review Required    Normal GFR on file in past 12 months    Normal serum creatinine on file in past 12 months     Arben RN, BSN

## 2024-04-23 ENCOUNTER — ANCILLARY PROCEDURE (OUTPATIENT)
Dept: GENERAL RADIOLOGY | Facility: CLINIC | Age: 54
End: 2024-04-23
Payer: COMMERCIAL

## 2024-04-23 ENCOUNTER — OFFICE VISIT (OUTPATIENT)
Dept: FAMILY MEDICINE | Facility: CLINIC | Age: 54
End: 2024-04-23
Payer: COMMERCIAL

## 2024-04-23 VITALS
OXYGEN SATURATION: 99 % | TEMPERATURE: 98.4 F | WEIGHT: 170 LBS | BODY MASS INDEX: 26.68 KG/M2 | RESPIRATION RATE: 20 BRPM | DIASTOLIC BLOOD PRESSURE: 81 MMHG | HEART RATE: 75 BPM | HEIGHT: 67 IN | SYSTOLIC BLOOD PRESSURE: 136 MMHG

## 2024-04-23 DIAGNOSIS — M79.604 PAIN OF RIGHT LOWER EXTREMITY: ICD-10-CM

## 2024-04-23 DIAGNOSIS — M79.604 PAIN OF RIGHT LOWER EXTREMITY: Primary | ICD-10-CM

## 2024-04-23 PROCEDURE — 73502 X-RAY EXAM HIP UNI 2-3 VIEWS: CPT | Mod: TC | Performed by: RADIOLOGY

## 2024-04-23 PROCEDURE — 99214 OFFICE O/P EST MOD 30 MIN: CPT | Mod: GC

## 2024-04-23 PROCEDURE — 72100 X-RAY EXAM L-S SPINE 2/3 VWS: CPT | Mod: TC | Performed by: RADIOLOGY

## 2024-04-23 RX ORDER — METHYLPREDNISOLONE 4 MG
TABLET, DOSE PACK ORAL
Qty: 21 TABLET | Refills: 0 | Status: SHIPPED | OUTPATIENT
Start: 2024-04-23 | End: 2024-04-27

## 2024-04-23 NOTE — PROGRESS NOTES
Preceptor Attestation:    I discussed the patient with the resident and evaluated the patient in person. I personally reviewed the imaging and agree with the interpretation documented by the resident. I have verified the content of the note, which accurately reflects my assessment of the patient and the plan of care.   Supervising Physician:  Frederick Constantino MD.

## 2024-04-23 NOTE — PROGRESS NOTES
"  Assessment & Plan     Pain of right lower extremity  Patient is been having 3-1/2 almost 4 weeks of posterior right leg pain that began in the buttocks, has since spread to the posterior thigh and calf.  Some associated tingling in the past week, no numbness.  No signs of cauda equina syndrome.  Noted pain with twisting of the back to the left and bending to the right and bending forward, could potentially represent L5 pathology given where the pain distribution is, no reflexes that coordinate to this area except for toes strength.  Given aggressively worsening course of pain over the past 3-1/2 weeks, concern for possible spinal pathology.  Starting with plain films and Medrol Dosepak plus PT, would like to see patient in 1 week if no marked improvement.  - XR Lumbar Spine 2/3 Views; Future  - XR Hip Right 2-3 Views; Future  - methylPREDNISolone (MEDROL DOSEPAK) 4 MG tablet therapy pack; Follow Package Directions  - Physical Therapy  Referral; Future  -If no improvement in 1 week, low threshold for MRI    Diagnosis or treatment significantly limited by social determinants of health - patient unable to work past 2-3 days with worsening leg pain  Ordering of each unique test  Prescription drug management  30 minutes spent by me on the date of the encounter doing chart review, history and exam, documentation and further activities per the note      Nicotine/Tobacco Cessation  He reports that he has been smoking cigarettes. He has never used smokeless tobacco.  Nicotine/Tobacco Cessation Plan  Currently taking nicotine gum to help with cravings      BMI  Estimated body mass index is 26.47 kg/m  as calculated from the following:    Height as of this encounter: 1.707 m (5' 7.2\").    Weight as of this encounter: 77.1 kg (170 lb).   Weight management plan: referred to PT      MEDICATIONS:   Orders Placed This Encounter   Medications    methylPREDNISolone (MEDROL DOSEPAK) 4 MG tablet therapy pack     Sig: Follow " "Package Directions     Dispense:  21 tablet     Refill:  0          - Continue other medications without change  DO NOT USE NAPROXEN WHILE ON STEROIDS    Return in about 1 week (around 4/30/2024), or if symptoms worsen or fail to improve, for If pain is not resolved in 1 week, come back for further workup.    Luis Conley is a 53 year old, presenting for the following health issues:  No chief complaint on file.  Right leg pain        4/23/2024    10:52 AM   Additional Questions   Roomed by johnna   Accompanied by female         4/23/2024    Information    services provided? No     HPI   R sided leg pain, started at back of buttock, progressed to back of thigh to back of calf, now down to ankle, stretching pain, starting 3.5 weeks ago, pain with rest and with activity, no position changes help, tried heat and cold, tried Aleve (took 4 today, started with 1-2 a day as directed). Associated tingling in buttock. Pain in buttock worse with coughing or blowing nose. No saddle anesthesia or incontinence.     Some difficulty with ambulation. Using a cane to walk. Heavily favoring R side with movement day to day.     Reports pain with twisting to the left, bending to the right, and bending forward.  Patient reports leaning backwards into the left helps a little bit with his pain.  Pain is overall out of 5 at baseline, but flares very easily to an 8-10 with any sort of movement.    Current smoker, using nicotine gum to try and wean down    Spouse notes that he has been more anxious, high strung, has not been sleeping well the past few days, and has been sweating with ambulation more readily.      Review of Systems  Constitutional, HEENT, cardiovascular, pulmonary, gi and gu systems are negative, except as otherwise noted.      Objective    /81   Pulse 75   Temp 98.4  F (36.9  C) (Oral)   Resp 20   Ht 1.707 m (5' 7.2\")   Wt 77.1 kg (170 lb)   SpO2 99%   BMI 26.47 kg/m    Body mass " "index is 26.47 kg/m .  Physical Exam   GENERAL: alert and no distress  MS: pain to palpation of deep buttock, posterior thigh, and calf, all muscle groups in this area tight and tender to palpation  SKIN: no suspicious lesions or rashes  NEURO: Normal strength and tone, mentation intact and speech normal  Comprehensive back pain exam:  No tenderness, Pain limits the following motions: L twist, R lateral bend, forward bend, ambulation (antalgic gait heavily favoring R with small steps, no foot drop, Lower extremity strength functional and equal on both sides, Lower extremity reflexes within normal limits bilaterally, Lower extremity sensation normal and equal on both sides, and Straight leg raise negative bilaterally  PSYCH: mentation appears normal, affect stressed, mood \"overwhelmed\", good eye contact, well-groomed and dressed appropriate to the weather    X ray lumbar spine and X ray right hip pending at time of writing        Signed Electronically by: Jacinta Rocha MD      "

## 2024-04-23 NOTE — PATIENT INSTRUCTIONS
We are going to give you a medication called a Medrol Dosepak.  This is a dose of steroid that should help calm down the pain in your back and leg.  Will only be for 5 days, do not take naproxen while you are taking this as there is a increased risk that you may get a stomach ulcer.  Can continue to take Tylenol, use IcyHot, and use ice and heat packs as needed during this.    Will also benefit from doing physical therapy through this.  They will help you with range of motion and being able to do more each day.    In the event that your symptoms do not get better within the next week of treatment, come back for a follow-up appointment where we will talk about more extensive workup.    If at any point in time you lose sensation around her inner thighs or groin, become incontinent of stool or urine, report to the emergency department right away as this may be a sign of acute spinal cord compression.

## 2024-04-24 ENCOUNTER — TELEPHONE (OUTPATIENT)
Dept: FAMILY MEDICINE | Facility: CLINIC | Age: 54
End: 2024-04-24
Payer: COMMERCIAL

## 2024-04-24 NOTE — TELEPHONE ENCOUNTER
[facility] phone call message- general phone call:    Reason for call: Pt need a doctors note for missing work 4/23/24-4/25/24. Would like a call back to confirm. Will  tomorrow.    Return call needed: Yes    OK to leave a message on voice mail? Yes    Primary language: English      needed? No    Call taken on April 24, 2024 at 1:37 PM by Jaycee Reyes

## 2024-04-24 NOTE — TELEPHONE ENCOUNTER
[facility] phone call message- patient requesting results:    Test: Lab and X-ray    Date of test: 04/23/24    Additional Comments: Seeking her husbands results.     OK to leave a message on voice mail? Yes    Primary language: English      needed? No    Call taken on April 24, 2024 at 12:34 PM by Jaycee Reyes

## 2024-04-24 NOTE — TELEPHONE ENCOUNTER
Pt's wife Malika is requesting a work note for pt. She states she was supposed to get one at their visit yesterday, but didn't. Would like to  in clinic tomorrow. Will need a call when letter is ready.  Arben RN, BSN

## 2024-04-24 NOTE — TELEPHONE ENCOUNTER
Spoke with pt's wife Malika after verifying consent to communicate on file. Lab results reviewed and imaging results will be communicated by provider if there is any concerning findings or follow up needed.   LETICIA Theodore, BSN

## 2024-09-21 ENCOUNTER — HEALTH MAINTENANCE LETTER (OUTPATIENT)
Age: 54
End: 2024-09-21